# Patient Record
Sex: FEMALE | Race: WHITE | NOT HISPANIC OR LATINO | Employment: OTHER | URBAN - METROPOLITAN AREA
[De-identification: names, ages, dates, MRNs, and addresses within clinical notes are randomized per-mention and may not be internally consistent; named-entity substitution may affect disease eponyms.]

---

## 2023-03-22 LAB
ALANINE AMINOTRANSFERASE (SGPT) (U/L) IN SER/PLAS: 13 U/L (ref 7–45)
ALBUMIN (G/DL) IN SER/PLAS: 4.2 G/DL (ref 3.4–5)
ALBUMIN (MG/L) IN URINE: 7.6 MG/L
ALBUMIN/CREATININE (UG/MG) IN URINE: 8 UG/MG CRT (ref 0–30)
ALKALINE PHOSPHATASE (U/L) IN SER/PLAS: 65 U/L (ref 33–136)
ANION GAP IN SER/PLAS: 12 MMOL/L (ref 10–20)
ASPARTATE AMINOTRANSFERASE (SGOT) (U/L) IN SER/PLAS: 16 U/L (ref 9–39)
BILIRUBIN TOTAL (MG/DL) IN SER/PLAS: 0.5 MG/DL (ref 0–1.2)
CALCIUM (MG/DL) IN SER/PLAS: 9.8 MG/DL (ref 8.6–10.6)
CARBON DIOXIDE, TOTAL (MMOL/L) IN SER/PLAS: 33 MMOL/L (ref 21–32)
CHLORIDE (MMOL/L) IN SER/PLAS: 101 MMOL/L (ref 98–107)
CHOLESTEROL (MG/DL) IN SER/PLAS: 128 MG/DL (ref 0–199)
CHOLESTEROL IN HDL (MG/DL) IN SER/PLAS: 65.8 MG/DL
CHOLESTEROL/HDL RATIO: 1.9
CREATININE (MG/DL) IN SER/PLAS: 0.78 MG/DL (ref 0.5–1.05)
CREATININE (MG/DL) IN URINE: 95.5 MG/DL (ref 20–320)
ESTIMATED AVERAGE GLUCOSE FOR HBA1C: 140 MG/DL
GFR FEMALE: 78 ML/MIN/1.73M2
GLUCOSE (MG/DL) IN SER/PLAS: 159 MG/DL (ref 74–99)
HEMOGLOBIN A1C/HEMOGLOBIN TOTAL IN BLOOD: 6.5 %
LDL: 34 MG/DL (ref 0–99)
LIPASE (U/L) IN SER/PLAS: 24 U/L (ref 9–82)
POTASSIUM (MMOL/L) IN SER/PLAS: 4 MMOL/L (ref 3.5–5.3)
PROTEIN TOTAL: 6.7 G/DL (ref 6.4–8.2)
SODIUM (MMOL/L) IN SER/PLAS: 142 MMOL/L (ref 136–145)
THYROTROPIN (MIU/L) IN SER/PLAS BY DETECTION LIMIT <= 0.05 MIU/L: 1.6 MIU/L (ref 0.44–3.98)
TRIGLYCERIDE (MG/DL) IN SER/PLAS: 141 MG/DL (ref 0–149)
UREA NITROGEN (MG/DL) IN SER/PLAS: 14 MG/DL (ref 6–23)
VLDL: 28 MG/DL (ref 0–40)

## 2023-04-04 DIAGNOSIS — I10 PRIMARY HYPERTENSION: Primary | ICD-10-CM

## 2023-04-04 RX ORDER — LISINOPRIL 10 MG/1
TABLET ORAL
Qty: 90 TABLET | Refills: 2 | Status: SHIPPED | OUTPATIENT
Start: 2023-04-04 | End: 2023-12-21

## 2023-05-14 PROBLEM — I87.8 VENOUS STASIS OF BOTH LOWER EXTREMITIES: Status: ACTIVE | Noted: 2023-05-14

## 2023-05-14 PROBLEM — H90.3 SENSORINEURAL HEARING LOSS, ASYMMETRICAL: Status: ACTIVE | Noted: 2023-05-14

## 2023-05-14 PROBLEM — R13.10 DYSPHAGIA: Status: ACTIVE | Noted: 2023-05-14

## 2023-05-14 PROBLEM — N85.00 ENDOMETRIAL HYPERPLASIA: Status: ACTIVE | Noted: 2023-05-14

## 2023-05-14 PROBLEM — K21.9 GASTROESOPHAGEAL REFLUX DISEASE: Status: ACTIVE | Noted: 2023-05-14

## 2023-05-14 PROBLEM — E28.39 HYPOESTROGENISM: Status: ACTIVE | Noted: 2023-05-14

## 2023-05-14 PROBLEM — I10 BENIGN ESSENTIAL HYPERTENSION: Status: ACTIVE | Noted: 2023-05-14

## 2023-05-14 PROBLEM — I63.9 CEREBROVASCULAR ACCIDENT (MULTI): Status: ACTIVE | Noted: 2023-05-14

## 2023-05-14 PROBLEM — N39.41 URGE INCONTINENCE: Status: ACTIVE | Noted: 2023-05-14

## 2023-05-14 PROBLEM — H04.123 DRY EYES, BILATERAL: Status: ACTIVE | Noted: 2023-05-14

## 2023-05-14 PROBLEM — M75.22: Status: ACTIVE | Noted: 2023-05-14

## 2023-05-14 PROBLEM — E55.9 MILD VITAMIN D DEFICIENCY: Status: ACTIVE | Noted: 2023-05-14

## 2023-05-14 PROBLEM — N39.3 STRESS INCONTINENCE IN FEMALE: Status: ACTIVE | Noted: 2023-05-14

## 2023-05-14 PROBLEM — M54.50 LOW BACK PAIN: Status: ACTIVE | Noted: 2023-05-14

## 2023-05-14 PROBLEM — F32.A DEPRESSION: Status: ACTIVE | Noted: 2023-05-14

## 2023-05-14 PROBLEM — M85.80 OSTEOPENIA: Status: ACTIVE | Noted: 2023-05-14

## 2023-05-14 PROBLEM — R33.9 URINARY RETENTION: Status: ACTIVE | Noted: 2023-05-14

## 2023-05-14 PROBLEM — M25.519 SHOULDER PAIN: Status: ACTIVE | Noted: 2023-05-14

## 2023-05-14 PROBLEM — I83.891 VARICOSE VEINS OF RIGHT LOWER EXTREMITY WITH EDEMA: Status: ACTIVE | Noted: 2023-05-14

## 2023-05-14 PROBLEM — R32 URINARY INCONTINENCE: Status: ACTIVE | Noted: 2023-05-14

## 2023-05-14 PROBLEM — R60.0 LOCALIZED EDEMA: Status: ACTIVE | Noted: 2023-05-14

## 2023-05-14 PROBLEM — E78.5 HYPERLIPIDEMIA: Status: ACTIVE | Noted: 2023-05-14

## 2023-05-14 PROBLEM — M25.562 LEFT KNEE PAIN: Status: ACTIVE | Noted: 2023-05-14

## 2023-05-14 PROBLEM — N32.81 OVERACTIVE BLADDER: Status: ACTIVE | Noted: 2023-05-14

## 2023-05-14 PROBLEM — E11.9 DIABETES MELLITUS (MULTI): Status: ACTIVE | Noted: 2023-05-14

## 2023-05-14 PROBLEM — M75.81 ROTATOR CUFF TENDONITIS, RIGHT: Status: ACTIVE | Noted: 2023-05-14

## 2023-05-14 PROBLEM — R53.83 FATIGUE: Status: ACTIVE | Noted: 2023-05-14

## 2023-05-14 RX ORDER — ASPIRIN 81 MG/1
1 TABLET ORAL DAILY
COMMUNITY
Start: 2020-01-02

## 2023-05-14 RX ORDER — FENOFIBRATE 48 MG/1
1 TABLET, FILM COATED ORAL DAILY
COMMUNITY
Start: 2014-06-24 | End: 2023-11-30

## 2023-05-14 RX ORDER — FESOTERODINE FUMARATE 8 MG/1
1 TABLET, FILM COATED, EXTENDED RELEASE ORAL DAILY
COMMUNITY
End: 2023-10-17 | Stop reason: ALTCHOICE

## 2023-05-14 RX ORDER — METFORMIN HYDROCHLORIDE 500 MG/1
TABLET, EXTENDED RELEASE ORAL
COMMUNITY
Start: 2013-08-09 | End: 2023-05-18 | Stop reason: ALTCHOICE

## 2023-05-14 RX ORDER — ALBUTEROL SULFATE 90 UG/1
AEROSOL, METERED RESPIRATORY (INHALATION)
COMMUNITY
Start: 2017-03-04

## 2023-05-14 RX ORDER — CHOLECALCIFEROL (VITAMIN D3) 50 MCG
TABLET ORAL
COMMUNITY

## 2023-05-14 RX ORDER — REPAGLINIDE 1 MG/1
TABLET ORAL
COMMUNITY
Start: 2021-06-24 | End: 2024-02-26 | Stop reason: SDUPTHER

## 2023-05-14 RX ORDER — SIMVASTATIN 20 MG/1
1 TABLET, FILM COATED ORAL NIGHTLY
COMMUNITY
Start: 2015-11-05 | End: 2023-06-26

## 2023-05-14 RX ORDER — PAROXETINE HYDROCHLORIDE 20 MG/1
1 TABLET, FILM COATED ORAL DAILY
COMMUNITY
End: 2023-11-20 | Stop reason: WASHOUT

## 2023-05-14 RX ORDER — MIRABEGRON 50 MG/1
1 TABLET, FILM COATED, EXTENDED RELEASE ORAL DAILY
COMMUNITY
Start: 2022-11-16 | End: 2023-10-17 | Stop reason: SDUPTHER

## 2023-05-14 RX ORDER — HYDROCHLOROTHIAZIDE 12.5 MG/1
CAPSULE ORAL
COMMUNITY

## 2023-05-14 RX ORDER — OMEPRAZOLE 20 MG/1
CAPSULE, DELAYED RELEASE ORAL
COMMUNITY
End: 2024-02-09 | Stop reason: ALTCHOICE

## 2023-05-14 RX ORDER — METFORMIN HYDROCHLORIDE 500 MG/1
TABLET ORAL 2 TIMES DAILY
COMMUNITY
End: 2023-05-18 | Stop reason: ALTCHOICE

## 2023-05-14 RX ORDER — REPAGLINIDE 2 MG/1
TABLET ORAL
COMMUNITY
Start: 2022-11-21

## 2023-05-18 ENCOUNTER — OFFICE VISIT (OUTPATIENT)
Dept: PRIMARY CARE | Facility: CLINIC | Age: 78
End: 2023-05-18
Payer: MEDICARE

## 2023-05-18 VITALS
DIASTOLIC BLOOD PRESSURE: 78 MMHG | WEIGHT: 186 LBS | TEMPERATURE: 97.6 F | SYSTOLIC BLOOD PRESSURE: 125 MMHG | BODY MASS INDEX: 30.02 KG/M2

## 2023-05-18 DIAGNOSIS — E11.9 TYPE 2 DIABETES MELLITUS WITHOUT COMPLICATION, WITHOUT LONG-TERM CURRENT USE OF INSULIN (MULTI): ICD-10-CM

## 2023-05-18 DIAGNOSIS — L03.115 CELLULITIS OF RIGHT LOWER LEG: ICD-10-CM

## 2023-05-18 DIAGNOSIS — I10 BENIGN ESSENTIAL HYPERTENSION: Primary | ICD-10-CM

## 2023-05-18 DIAGNOSIS — F32.A DEPRESSION, UNSPECIFIED DEPRESSION TYPE: ICD-10-CM

## 2023-05-18 PROBLEM — E66.01 OBESITY, MORBID (MULTI): Status: ACTIVE | Noted: 2023-05-18

## 2023-05-18 PROCEDURE — 3078F DIAST BP <80 MM HG: CPT | Performed by: INTERNAL MEDICINE

## 2023-05-18 PROCEDURE — 1159F MED LIST DOCD IN RCRD: CPT | Performed by: INTERNAL MEDICINE

## 2023-05-18 PROCEDURE — 3074F SYST BP LT 130 MM HG: CPT | Performed by: INTERNAL MEDICINE

## 2023-05-18 PROCEDURE — 99213 OFFICE O/P EST LOW 20 MIN: CPT | Performed by: INTERNAL MEDICINE

## 2023-05-18 PROCEDURE — 1036F TOBACCO NON-USER: CPT | Performed by: INTERNAL MEDICINE

## 2023-05-18 RX ORDER — CEPHALEXIN 250 MG/1
250 CAPSULE ORAL 4 TIMES DAILY
Qty: 28 CAPSULE | Refills: 0 | Status: SHIPPED | OUTPATIENT
Start: 2023-05-18 | End: 2023-05-25

## 2023-05-18 RX ORDER — METFORMIN HYDROCHLORIDE 500 MG/1
500 TABLET ORAL
COMMUNITY
End: 2024-05-24 | Stop reason: SDUPTHER

## 2023-05-18 ASSESSMENT — ENCOUNTER SYMPTOMS
FEVER: 0
DEPRESSION: 0
DIZZINESS: 0
BLOOD IN STOOL: 0
HEADACHES: 0
MYALGIAS: 0
COLOR CHANGE: 1
CHILLS: 0
DIARRHEA: 0
CONSTIPATION: 0
NECK PAIN: 0
ABDOMINAL PAIN: 0
SHORTNESS OF BREATH: 0
FATIGUE: 0
OCCASIONAL FEELINGS OF UNSTEADINESS: 1
BACK PAIN: 0
PALPITATIONS: 0
COUGH: 0
ARTHRALGIAS: 0
LOSS OF SENSATION IN FEET: 0

## 2023-05-18 NOTE — PATIENT INSTRUCTIONS
Seen today for cellulitis in your right leg  Elevate your leg  Restart Keflex 4 times a day  Please go to the ER if symptoms do not improve  Follow-up in 6 months or sooner if needed

## 2023-05-18 NOTE — PROGRESS NOTES
Subjective   Patient ID: Madalyn Chavez is a 77 y.o. female.    HPI  Patient is seen today for follow-up after ER visit 2 weeks ago for cellulitis in her right leg.  She was experiencing worsening redness pain and swelling.  She was diagnosed with cellulitis and given Keflex.  She completed the medication and has noticed some improvement .  Her right leg is still swollen and appears red.  Review of Systems   Constitutional:  Negative for chills, fatigue and fever.   Respiratory:  Negative for cough and shortness of breath.    Cardiovascular:  Negative for chest pain, palpitations and leg swelling.   Gastrointestinal:  Negative for abdominal pain, blood in stool, constipation and diarrhea.   Endocrine: Negative for cold intolerance and heat intolerance.   Musculoskeletal:  Negative for arthralgias, back pain, myalgias and neck pain.   Skin:  Positive for color change.        Right leg redness+   Neurological:  Negative for dizziness and headaches.       Objective   Physical Exam  Vitals reviewed.   Constitutional:       General: She is not in acute distress.     Appearance: Normal appearance.   HENT:      Head: Normocephalic and atraumatic.      Mouth/Throat:      Mouth: Mucous membranes are moist.   Eyes:      Extraocular Movements: Extraocular movements intact.      Conjunctiva/sclera: Conjunctivae normal.      Pupils: Pupils are equal, round, and reactive to light.   Cardiovascular:      Rate and Rhythm: Normal rate and regular rhythm.      Pulses: Normal pulses.   Pulmonary:      Effort: Pulmonary effort is normal. No respiratory distress.      Breath sounds: Normal breath sounds.   Abdominal:      General: Bowel sounds are normal. There is no distension.      Tenderness: There is no abdominal tenderness.   Musculoskeletal:         General: No swelling or tenderness. Normal range of motion.      Cervical back: Normal range of motion.   Skin:     General: Skin is warm.      Comments: Right lower  leg-erythema, slight tenderness on palpation and edema   Neurological:      General: No focal deficit present.      Mental Status: She is alert.      Coordination: Coordination normal.      Gait: Gait normal.   Psychiatric:         Mood and Affect: Mood normal.         Behavior: Behavior normal.         Assessment/Plan   Diagnoses and all orders for this visit:  Benign essential hypertension  Comments:  Continue with lisinopril  Type 2 diabetes mellitus without complication, without long-term current use of insulin (CMS/[facility])  Comments:  Continue with metformin as prescribed  Depression, unspecified depression type  Comments:  Stable  Continue with current medication  Cellulitis of right lower leg  Comments:  Repeat the dose of Keflex given history of allergies to several antibiotics  Elevate leg while sitting  Take NSAIDs as needed  Orders:  -     cephalexin (Keflex) 250 mg capsule; Take 1 capsule (250 mg) by mouth 4 times a day for 7 days.

## 2023-06-17 LAB — URINE CULTURE: ABNORMAL

## 2023-06-20 ENCOUNTER — TELEPHONE (OUTPATIENT)
Dept: PRIMARY CARE | Facility: CLINIC | Age: 78
End: 2023-06-20
Payer: MEDICARE

## 2023-06-20 DIAGNOSIS — L03.115 CELLULITIS OF RIGHT LOWER LEG: Primary | ICD-10-CM

## 2023-06-26 DIAGNOSIS — E78.49 OTHER HYPERLIPIDEMIA: ICD-10-CM

## 2023-06-26 DIAGNOSIS — I10 PRIMARY HYPERTENSION: Primary | ICD-10-CM

## 2023-06-26 RX ORDER — HYDROCHLOROTHIAZIDE 12.5 MG/1
TABLET ORAL
Qty: 90 TABLET | Refills: 1 | Status: SHIPPED | OUTPATIENT
Start: 2023-06-26 | End: 2024-01-02

## 2023-06-26 RX ORDER — SIMVASTATIN 20 MG/1
TABLET, FILM COATED ORAL
Qty: 90 TABLET | Refills: 1 | Status: SHIPPED | OUTPATIENT
Start: 2023-06-26 | End: 2024-01-02

## 2023-07-28 LAB
ALANINE AMINOTRANSFERASE (SGPT) (U/L) IN SER/PLAS: 10 U/L (ref 7–45)
ALBUMIN (G/DL) IN SER/PLAS: 3.9 G/DL (ref 3.4–5)
ALBUMIN (MG/L) IN URINE: 77.7 MG/L
ALBUMIN/CREATININE (UG/MG) IN URINE: 74.7 UG/MG CRT (ref 0–30)
ALKALINE PHOSPHATASE (U/L) IN SER/PLAS: 69 U/L (ref 33–136)
ANION GAP IN SER/PLAS: 14 MMOL/L (ref 10–20)
ASPARTATE AMINOTRANSFERASE (SGOT) (U/L) IN SER/PLAS: 15 U/L (ref 9–39)
BILIRUBIN TOTAL (MG/DL) IN SER/PLAS: 0.5 MG/DL (ref 0–1.2)
CALCIUM (MG/DL) IN SER/PLAS: 9.5 MG/DL (ref 8.6–10.6)
CARBON DIOXIDE, TOTAL (MMOL/L) IN SER/PLAS: 29 MMOL/L (ref 21–32)
CHLORIDE (MMOL/L) IN SER/PLAS: 105 MMOL/L (ref 98–107)
CHOLESTEROL (MG/DL) IN SER/PLAS: 115 MG/DL (ref 0–199)
CHOLESTEROL IN HDL (MG/DL) IN SER/PLAS: 56.1 MG/DL
CHOLESTEROL/HDL RATIO: 2
CREATININE (MG/DL) IN SER/PLAS: 1.19 MG/DL (ref 0.5–1.05)
CREATININE (MG/DL) IN URINE: 104 MG/DL (ref 20–320)
ESTIMATED AVERAGE GLUCOSE FOR HBA1C: 146 MG/DL
GFR FEMALE: 47 ML/MIN/1.73M2
GLUCOSE (MG/DL) IN SER/PLAS: 119 MG/DL (ref 74–99)
HEMOGLOBIN A1C/HEMOGLOBIN TOTAL IN BLOOD: 6.7 %
LDL: 30 MG/DL (ref 0–99)
POTASSIUM (MMOL/L) IN SER/PLAS: 4.1 MMOL/L (ref 3.5–5.3)
PROTEIN TOTAL: 6.8 G/DL (ref 6.4–8.2)
SODIUM (MMOL/L) IN SER/PLAS: 144 MMOL/L (ref 136–145)
THYROTROPIN (MIU/L) IN SER/PLAS BY DETECTION LIMIT <= 0.05 MIU/L: 0.99 MIU/L (ref 0.44–3.98)
TRIGLYCERIDE (MG/DL) IN SER/PLAS: 146 MG/DL (ref 0–149)
UREA NITROGEN (MG/DL) IN SER/PLAS: 22 MG/DL (ref 6–23)
VLDL: 29 MG/DL (ref 0–40)

## 2023-10-04 ENCOUNTER — LAB (OUTPATIENT)
Dept: LAB | Facility: LAB | Age: 78
End: 2023-10-04
Payer: MEDICARE

## 2023-10-04 DIAGNOSIS — E11.9 TYPE 2 DIABETES MELLITUS WITHOUT COMPLICATIONS (MULTI): Primary | ICD-10-CM

## 2023-10-04 LAB
ANION GAP SERPL CALC-SCNC: 14 MMOL/L (ref 10–20)
BUN SERPL-MCNC: 18 MG/DL (ref 6–23)
CALCIUM SERPL-MCNC: 9.5 MG/DL (ref 8.6–10.6)
CHLORIDE SERPL-SCNC: 104 MMOL/L (ref 98–107)
CO2 SERPL-SCNC: 28 MMOL/L (ref 21–32)
CREAT SERPL-MCNC: 0.82 MG/DL (ref 0.5–1.05)
CREAT UR-MCNC: 85.9 MG/DL (ref 20–320)
GFR SERPL CREATININE-BSD FRML MDRD: 74 ML/MIN/1.73M*2
GLUCOSE SERPL-MCNC: 181 MG/DL (ref 74–99)
MICROALBUMIN UR-MCNC: <7 MG/L
MICROALBUMIN/CREAT UR: NORMAL MG/G{CREAT}
POTASSIUM SERPL-SCNC: 3.7 MMOL/L (ref 3.5–5.3)
SODIUM SERPL-SCNC: 142 MMOL/L (ref 136–145)

## 2023-10-04 PROCEDURE — 36415 COLL VENOUS BLD VENIPUNCTURE: CPT

## 2023-10-04 PROCEDURE — 82570 ASSAY OF URINE CREATININE: CPT

## 2023-10-04 PROCEDURE — 80048 BASIC METABOLIC PNL TOTAL CA: CPT

## 2023-10-04 PROCEDURE — 82043 UR ALBUMIN QUANTITATIVE: CPT

## 2023-10-14 DIAGNOSIS — F32.A DEPRESSION, UNSPECIFIED DEPRESSION TYPE: Primary | ICD-10-CM

## 2023-10-15 PROBLEM — L57.0 ACTINIC KERATOSIS: Status: ACTIVE | Noted: 2022-05-31

## 2023-10-15 PROBLEM — H52.4 MYOPIA WITH PRESBYOPIA: Status: ACTIVE | Noted: 2023-10-15

## 2023-10-15 PROBLEM — L21.9 SEBORRHEIC DERMATITIS, UNSPECIFIED: Status: ACTIVE | Noted: 2022-05-31

## 2023-10-15 PROBLEM — Z46.89 PESSARY MAINTENANCE: Status: ACTIVE | Noted: 2023-10-15

## 2023-10-15 PROBLEM — H52.00 HYPEROPIA: Status: ACTIVE | Noted: 2023-10-15

## 2023-10-15 PROBLEM — H93.19 TINNITUS: Status: ACTIVE | Noted: 2023-10-15

## 2023-10-15 PROBLEM — L81.4 OTHER MELANIN HYPERPIGMENTATION: Status: ACTIVE | Noted: 2022-05-31

## 2023-10-15 PROBLEM — F33.9 DEPRESSION, MAJOR, RECURRENT (CMS-HCC): Status: ACTIVE | Noted: 2023-10-15

## 2023-10-15 PROBLEM — E83.110: Status: ACTIVE | Noted: 2023-10-15

## 2023-10-15 PROBLEM — N95.1 MENOPAUSE SYNDROME: Status: ACTIVE | Noted: 2023-10-15

## 2023-10-15 PROBLEM — D18.01 HEMANGIOMA OF SKIN AND SUBCUTANEOUS TISSUE: Status: ACTIVE | Noted: 2022-05-31

## 2023-10-15 PROBLEM — H26.492 PCO (POSTERIOR CAPSULAR OPACIFICATION), LEFT: Status: ACTIVE | Noted: 2023-10-15

## 2023-10-15 PROBLEM — E66.3 OVERWEIGHT WITH BODY MASS INDEX (BMI) OF 28 TO 28.9 IN ADULT: Status: ACTIVE | Noted: 2023-10-15

## 2023-10-15 PROBLEM — L90.5 SCAR CONDITION AND FIBROSIS OF SKIN: Status: ACTIVE | Noted: 2022-05-31

## 2023-10-15 PROBLEM — T83.89XA VAGINAL EROSION SECONDARY TO PESSARY USE (CMS-HCC): Status: ACTIVE | Noted: 2023-10-15

## 2023-10-15 PROBLEM — D23.5 DERMATOFIBROMA OF TRUNK: Status: ACTIVE | Noted: 2022-05-31

## 2023-10-15 PROBLEM — L82.0 INFLAMED SEBORRHEIC KERATOSIS: Status: ACTIVE | Noted: 2022-05-31

## 2023-10-15 PROBLEM — N89.8 VAGINAL EROSION SECONDARY TO PESSARY USE (CMS-HCC): Status: ACTIVE | Noted: 2023-10-15

## 2023-10-15 PROBLEM — E55.9 VITAMIN D DEFICIENCY: Status: ACTIVE | Noted: 2023-10-15

## 2023-10-15 PROBLEM — D49.9 NEOPLASM: Status: ACTIVE | Noted: 2022-05-31

## 2023-10-15 PROBLEM — Z85.828 PERSONAL HISTORY OF OTHER MALIGNANT NEOPLASM OF SKIN: Status: ACTIVE | Noted: 2022-05-31

## 2023-10-15 PROBLEM — D23.9 OTHER BENIGN NEOPLASM OF SKIN, UNSPECIFIED: Status: ACTIVE | Noted: 2022-05-31

## 2023-10-15 PROBLEM — Z96.1 PSEUDOPHAKIA OF BOTH EYES: Status: ACTIVE | Noted: 2023-10-15

## 2023-10-15 PROBLEM — N81.3 COMPLETE UTEROVAGINAL PROLAPSE: Status: ACTIVE | Noted: 2023-10-15

## 2023-10-15 PROBLEM — E66.811 CLASS 1 OBESITY DUE TO EXCESS CALORIES WITH BODY MASS INDEX (BMI) OF 30.0 TO 30.9 IN ADULT: Status: ACTIVE | Noted: 2023-10-15

## 2023-10-15 PROBLEM — H52.10 MYOPIA WITH PRESBYOPIA: Status: ACTIVE | Noted: 2023-10-15

## 2023-10-15 PROBLEM — D48.5 NEOPLASM OF UNCERTAIN BEHAVIOR OF SKIN: Status: ACTIVE | Noted: 2022-05-31

## 2023-10-15 PROBLEM — L57.9 SKIN CHANGES DUE TO CHRONIC EXPOSURE TO NONIONIZING RADIATION, UNSPECIFIED: Status: ACTIVE | Noted: 2022-05-31

## 2023-10-15 PROBLEM — E66.09 CLASS 1 OBESITY DUE TO EXCESS CALORIES WITH BODY MASS INDEX (BMI) OF 30.0 TO 30.9 IN ADULT: Status: ACTIVE | Noted: 2023-10-15

## 2023-10-15 RX ORDER — LANCETS
EACH MISCELLANEOUS
COMMUNITY

## 2023-10-15 RX ORDER — PAROXETINE HYDROCHLORIDE 40 MG/1
40 TABLET, FILM COATED ORAL DAILY
COMMUNITY
End: 2024-05-31 | Stop reason: SDUPTHER

## 2023-10-15 RX ORDER — DICLOFENAC SODIUM 10 MG/G
4 GEL TOPICAL 2 TIMES DAILY PRN
COMMUNITY

## 2023-10-15 RX ORDER — INSULIN PUMP SYRINGE, 3 ML
1 EACH MISCELLANEOUS AS NEEDED
COMMUNITY

## 2023-10-15 RX ORDER — OMEPRAZOLE 40 MG/1
40 CAPSULE, DELAYED RELEASE ORAL DAILY
COMMUNITY
Start: 2023-08-12 | End: 2024-02-09 | Stop reason: ALTCHOICE

## 2023-10-15 RX ORDER — ORAL SEMAGLUTIDE 3 MG/1
3 TABLET ORAL DAILY
COMMUNITY
Start: 2023-06-20

## 2023-10-15 RX ORDER — BLOOD-GLUCOSE METER
EACH MISCELLANEOUS
COMMUNITY

## 2023-10-15 RX ORDER — BLOOD SUGAR DIAGNOSTIC
STRIP MISCELLANEOUS
COMMUNITY
End: 2024-06-07 | Stop reason: SDUPTHER

## 2023-10-15 RX ORDER — GRANULES FOR ORAL 3 G/1
POWDER ORAL
COMMUNITY
Start: 2023-06-15 | End: 2023-12-22 | Stop reason: SDUPTHER

## 2023-10-16 RX ORDER — PAROXETINE HYDROCHLORIDE 40 MG/1
40 TABLET, FILM COATED ORAL DAILY
Qty: 90 TABLET | Refills: 0 | Status: SHIPPED | OUTPATIENT
Start: 2023-10-16 | End: 2023-11-20 | Stop reason: WASHOUT

## 2023-10-17 ENCOUNTER — OFFICE VISIT (OUTPATIENT)
Dept: OBSTETRICS AND GYNECOLOGY | Facility: CLINIC | Age: 78
End: 2023-10-17
Payer: MEDICARE

## 2023-10-17 VITALS
SYSTOLIC BLOOD PRESSURE: 118 MMHG | DIASTOLIC BLOOD PRESSURE: 70 MMHG | WEIGHT: 186 LBS | BODY MASS INDEX: 29.19 KG/M2 | HEIGHT: 67 IN

## 2023-10-17 DIAGNOSIS — N39.41 URGE URINARY INCONTINENCE: Primary | ICD-10-CM

## 2023-10-17 PROCEDURE — 1159F MED LIST DOCD IN RCRD: CPT | Performed by: NURSE PRACTITIONER

## 2023-10-17 PROCEDURE — 99213 OFFICE O/P EST LOW 20 MIN: CPT | Performed by: NURSE PRACTITIONER

## 2023-10-17 PROCEDURE — 99213 OFFICE O/P EST LOW 20 MIN: CPT | Mod: PO | Performed by: NURSE PRACTITIONER

## 2023-10-17 PROCEDURE — 1126F AMNT PAIN NOTED NONE PRSNT: CPT | Performed by: NURSE PRACTITIONER

## 2023-10-17 PROCEDURE — 3074F SYST BP LT 130 MM HG: CPT | Performed by: NURSE PRACTITIONER

## 2023-10-17 PROCEDURE — 3078F DIAST BP <80 MM HG: CPT | Performed by: NURSE PRACTITIONER

## 2023-10-17 PROCEDURE — 51798 US URINE CAPACITY MEASURE: CPT | Performed by: NURSE PRACTITIONER

## 2023-10-17 PROCEDURE — 1036F TOBACCO NON-USER: CPT | Performed by: NURSE PRACTITIONER

## 2023-10-17 PROCEDURE — 51798 US URINE CAPACITY MEASURE: CPT | Mod: PO | Performed by: NURSE PRACTITIONER

## 2023-10-17 RX ORDER — MIRABEGRON 50 MG/1
50 TABLET, FILM COATED, EXTENDED RELEASE ORAL DAILY
Qty: 90 TABLET | Refills: 3 | Status: SHIPPED | OUTPATIENT
Start: 2023-10-17 | End: 2024-10-16

## 2023-10-17 ASSESSMENT — PAIN SCALES - GENERAL: PAINLEVEL: 0-NO PAIN

## 2023-10-17 NOTE — PROGRESS NOTES
Madalyn Chavez is a 78 y.o. female who received 180 units  1 months ago. Patient denies worsening symptoms.  Still taking Myrbetriq 50 mg     Testing results:  PVR results are available and reviewed, 123 ml   UA results not available, unable to void     Color, Urine   Date Value Ref Range Status   01/24/2020 YELLOW STRAW,YELLOW Final     Appearance, Urine   Date Value Ref Range Status   01/24/2020 HAZY CLEAR Final     pH, Urine   Date Value Ref Range Status   01/24/2020 9.0 (H) 5.0 - 8.0 Final     Protein, Urine   Date Value Ref Range Status   01/24/2020 30(1+) (A) NEGATIVE mg/dL Final     Glucose, Urine   Date Value Ref Range Status   01/24/2020 NEGATIVE NEGATIVE mg/dL Final     Blood, Urine   Date Value Ref Range Status   01/24/2020 NEGATIVE NEGATIVE Final     Ketones, Urine   Date Value Ref Range Status   01/24/2020 NEGATIVE NEGATIVE mg/dL Final     Bilirubin, Urine   Date Value Ref Range Status   01/24/2020 NEGATIVE NEGATIVE Final     Urobilinogen, Urine   Date Value Ref Range Status   01/24/2020 <2.0 0.0 - 1.9 mg/dL Final     Nitrite, Urine   Date Value Ref Range Status   01/24/2020 NEGATIVE NEGATIVE Final     Urine Culture   Date Value Ref Range Status   06/13/2023 Pseudomonas aeruginosa (A)  Final     Comment:     >100,000 CFU/ML       History:  Urge Symptoms:   Some women receive very little warning and suddenly find that they are losing, or about to lose, urine beyond their control. How often does this happen to you? 2 - Sometimes  If you can't find a toilet or find that the toilet is occupied when you have an urge to urinate, how often do you end up lossing urine or wetting yourself? 2 - Sometimes  How often do you urinate at night? 1 - Rarely    Assessment and Plan  78 y.o. female assessed in clinic today for Botox fuv for OAB    OAB    Plan  OAB  Begin setting alarm to urinate to avoid wetting herself   Jennifer was removed from med list  50 MG Myrbetriq 90-day was renewed     Follow-up in 5 months  with Dr. Borja for Botox     REAGAN Voss Megan Terrell, am scribing for virtually, and in the presence of REAGAN Muse on 10/17/2023 at 5:36 PM.

## 2023-11-20 ENCOUNTER — OFFICE VISIT (OUTPATIENT)
Dept: PRIMARY CARE | Facility: CLINIC | Age: 78
End: 2023-11-20
Payer: MEDICARE

## 2023-11-20 VITALS
HEIGHT: 67 IN | BODY MASS INDEX: 29.03 KG/M2 | DIASTOLIC BLOOD PRESSURE: 76 MMHG | WEIGHT: 185 LBS | SYSTOLIC BLOOD PRESSURE: 130 MMHG | TEMPERATURE: 96.8 F

## 2023-11-20 DIAGNOSIS — Z00.00 MEDICARE ANNUAL WELLNESS VISIT, SUBSEQUENT: ICD-10-CM

## 2023-11-20 DIAGNOSIS — I10 BENIGN ESSENTIAL HYPERTENSION: Primary | ICD-10-CM

## 2023-11-20 DIAGNOSIS — Z23 INFLUENZA VACCINATION ADMINISTERED AT CURRENT VISIT: ICD-10-CM

## 2023-11-20 DIAGNOSIS — F33.41 RECURRENT MAJOR DEPRESSIVE DISORDER, IN PARTIAL REMISSION (CMS-HCC): ICD-10-CM

## 2023-11-20 DIAGNOSIS — E83.110: ICD-10-CM

## 2023-11-20 DIAGNOSIS — E11.9 TYPE 2 DIABETES MELLITUS WITHOUT COMPLICATION, WITHOUT LONG-TERM CURRENT USE OF INSULIN (MULTI): ICD-10-CM

## 2023-11-20 DIAGNOSIS — F32.A DEPRESSION, UNSPECIFIED DEPRESSION TYPE: ICD-10-CM

## 2023-11-20 DIAGNOSIS — E66.01 OBESITY, MORBID (MULTI): ICD-10-CM

## 2023-11-20 DIAGNOSIS — K21.9 GASTROESOPHAGEAL REFLUX DISEASE WITHOUT ESOPHAGITIS: ICD-10-CM

## 2023-11-20 LAB — POC HEMOGLOBIN A1C: 6.4 % (ref 4.2–6.5)

## 2023-11-20 PROCEDURE — 1160F RVW MEDS BY RX/DR IN RCRD: CPT | Performed by: INTERNAL MEDICINE

## 2023-11-20 PROCEDURE — 83036 HEMOGLOBIN GLYCOSYLATED A1C: CPT | Performed by: INTERNAL MEDICINE

## 2023-11-20 PROCEDURE — 1036F TOBACCO NON-USER: CPT | Performed by: INTERNAL MEDICINE

## 2023-11-20 PROCEDURE — 1159F MED LIST DOCD IN RCRD: CPT | Performed by: INTERNAL MEDICINE

## 2023-11-20 PROCEDURE — 99214 OFFICE O/P EST MOD 30 MIN: CPT | Performed by: INTERNAL MEDICINE

## 2023-11-20 PROCEDURE — 3078F DIAST BP <80 MM HG: CPT | Performed by: INTERNAL MEDICINE

## 2023-11-20 PROCEDURE — 1126F AMNT PAIN NOTED NONE PRSNT: CPT | Performed by: INTERNAL MEDICINE

## 2023-11-20 PROCEDURE — 90662 IIV NO PRSV INCREASED AG IM: CPT | Performed by: INTERNAL MEDICINE

## 2023-11-20 PROCEDURE — G0008 ADMIN INFLUENZA VIRUS VAC: HCPCS | Performed by: INTERNAL MEDICINE

## 2023-11-20 PROCEDURE — 1170F FXNL STATUS ASSESSED: CPT | Performed by: INTERNAL MEDICINE

## 2023-11-20 PROCEDURE — G0439 PPPS, SUBSEQ VISIT: HCPCS | Performed by: INTERNAL MEDICINE

## 2023-11-20 PROCEDURE — 3075F SYST BP GE 130 - 139MM HG: CPT | Performed by: INTERNAL MEDICINE

## 2023-11-20 ASSESSMENT — ENCOUNTER SYMPTOMS
ABDOMINAL PAIN: 0
CONSTIPATION: 0
COUGH: 0
SLEEP DISTURBANCE: 0
OCCASIONAL FEELINGS OF UNSTEADINESS: 0
LOSS OF SENSATION IN FEET: 0
PALPITATIONS: 0
CHILLS: 0
BACK PAIN: 0
HEADACHES: 0
BLOOD IN STOOL: 0
FREQUENCY: 0
MYALGIAS: 0
NECK PAIN: 0
DIZZINESS: 0
ARTHRALGIAS: 0
SHORTNESS OF BREATH: 0
FEVER: 0
SINUS PRESSURE: 0
FATIGUE: 1
DEPRESSION: 0
DIARRHEA: 0
WEAKNESS: 0

## 2023-11-20 ASSESSMENT — ACTIVITIES OF DAILY LIVING (ADL)
MANAGING_FINANCES: INDEPENDENT
DRESSING: INDEPENDENT
BATHING: INDEPENDENT
BATHING: INDEPENDENT
TAKING_MEDICATION: INDEPENDENT
DRESSING: INDEPENDENT
DOING_HOUSEWORK: INDEPENDENT
GROCERY_SHOPPING: INDEPENDENT

## 2023-11-20 ASSESSMENT — PATIENT HEALTH QUESTIONNAIRE - PHQ9
SUM OF ALL RESPONSES TO PHQ9 QUESTIONS 1 AND 2: 0
2. FEELING DOWN, DEPRESSED OR HOPELESS: NOT AT ALL
1. LITTLE INTEREST OR PLEASURE IN DOING THINGS: NOT AT ALL

## 2023-11-20 NOTE — PROGRESS NOTES
Subjective   Patient ID: Madalyn Chavez is a 78 y.o. female.    HPI patient seen for Medicare wellness visit.  Her medical history is significant for hypertension, hyperlipidemia and GERD.  She complains of chronic pain in her right leg with slight erythema.  She wears compression stockings during the day.  Her swelling has improved since her last visit.  She was sent to wound clinic but was discharged as she did not have any significant wound.  She feels well today.  Denies any shortness of breath or chest pain.  She was seeing endocrinologist for her diabetes who left her practice recently.  She is currently on metformin and repaglinide.    Review of Systems   Constitutional:  Positive for fatigue. Negative for chills and fever.   HENT:  Negative for congestion, sinus pressure and sneezing.    Respiratory:  Negative for cough and shortness of breath.    Cardiovascular:  Negative for chest pain, palpitations and leg swelling.   Gastrointestinal:  Negative for abdominal pain, blood in stool, constipation and diarrhea.   Endocrine: Negative for cold intolerance and heat intolerance.   Genitourinary:  Negative for frequency and urgency.   Musculoskeletal:  Negative for arthralgias, back pain, myalgias and neck pain.        Right leg PAIN   Neurological:  Negative for dizziness, weakness and headaches.   Psychiatric/Behavioral:  Negative for sleep disturbance.        Objective   Physical Exam  Vitals reviewed.   Constitutional:       General: She is not in acute distress.     Appearance: Normal appearance.   HENT:      Head: Normocephalic and atraumatic.      Mouth/Throat:      Mouth: Mucous membranes are moist.   Eyes:      Extraocular Movements: Extraocular movements intact.      Conjunctiva/sclera: Conjunctivae normal.      Pupils: Pupils are equal, round, and reactive to light.   Cardiovascular:      Rate and Rhythm: Normal rate and regular rhythm.      Pulses: Normal pulses.   Pulmonary:      Effort:  Pulmonary effort is normal. No respiratory distress.      Breath sounds: Normal breath sounds.   Abdominal:      General: Bowel sounds are normal. There is no distension.      Tenderness: There is no abdominal tenderness.   Musculoskeletal:         General: No swelling or tenderness. Normal range of motion.      Cervical back: Normal range of motion.   Skin:     General: Skin is warm.   Neurological:      General: No focal deficit present.      Mental Status: She is alert.      Coordination: Coordination normal.      Gait: Gait normal.   Psychiatric:         Mood and Affect: Mood normal.         Behavior: Behavior normal.         Assessment/Plan   Diagnoses and all orders for this visit:  Benign essential hypertension  Comments:  Stable  Continue with hydrochlorothiazide  Type 2 diabetes mellitus without complication, without long-term current use of insulin (CMS/[facility])  Comments:  Currently on metformin and Rybelsus  Checked hemoglobin A1c today 6.4-well-controlled  Orders:  -     POCT glycosylated hemoglobin (Hb A1C) manually resulted  Depression, unspecified depression type  Medicare annual wellness visit, subsequent  Recurrent major depressive disorder, in partial remission (CMS/[facility])  Comments:  Stable  Continue with paroxetine  Obesity, morbid (CMS/[facility])  Comments:  BMI is improving  Diabetes, bronzed (CMS/[facility])  Influenza vaccination administered at current visit  Comments:  High-dose influenza vaccine administered  Orders:  -     Flu vaccine, quadrivalent, high-dose, preservative free, age 65y+ (FLUZONE)  Gastroesophageal reflux disease without esophagitis  Comments:  Continue with omeprazole 40 mg daily

## 2023-11-29 DIAGNOSIS — E78.49 OTHER HYPERLIPIDEMIA: Primary | ICD-10-CM

## 2023-11-30 RX ORDER — FENOFIBRATE 48 MG/1
48 TABLET, FILM COATED ORAL DAILY
Qty: 90 TABLET | Refills: 3 | Status: SHIPPED | OUTPATIENT
Start: 2023-11-30

## 2023-12-14 ENCOUNTER — TELEPHONE (OUTPATIENT)
Dept: OBSTETRICS AND GYNECOLOGY | Facility: CLINIC | Age: 78
End: 2023-12-14
Payer: MEDICARE

## 2023-12-14 DIAGNOSIS — R39.9 UTI SYMPTOMS: Primary | ICD-10-CM

## 2023-12-18 ENCOUNTER — LAB (OUTPATIENT)
Dept: LAB | Facility: LAB | Age: 78
End: 2023-12-18
Payer: MEDICARE

## 2023-12-18 DIAGNOSIS — R39.9 UTI SYMPTOMS: ICD-10-CM

## 2023-12-18 PROCEDURE — 87086 URINE CULTURE/COLONY COUNT: CPT

## 2023-12-20 DIAGNOSIS — I10 PRIMARY HYPERTENSION: ICD-10-CM

## 2023-12-20 LAB — BACTERIA UR CULT: ABNORMAL

## 2023-12-21 RX ORDER — LISINOPRIL 10 MG/1
TABLET ORAL
Qty: 90 TABLET | Refills: 2 | Status: SHIPPED | OUTPATIENT
Start: 2023-12-21

## 2023-12-22 ENCOUNTER — LAB (OUTPATIENT)
Dept: LAB | Facility: LAB | Age: 78
End: 2023-12-22
Payer: MEDICARE

## 2023-12-22 ENCOUNTER — TELEPHONE (OUTPATIENT)
Dept: OBSTETRICS AND GYNECOLOGY | Facility: CLINIC | Age: 78
End: 2023-12-22
Payer: MEDICARE

## 2023-12-22 DIAGNOSIS — R39.9 UTI SYMPTOMS: ICD-10-CM

## 2023-12-22 DIAGNOSIS — R39.9 UTI SYMPTOMS: Primary | ICD-10-CM

## 2023-12-22 PROCEDURE — 87181 SC STD AGAR DILUTION PER AGT: CPT

## 2023-12-22 PROCEDURE — 87086 URINE CULTURE/COLONY COUNT: CPT

## 2023-12-22 RX ORDER — GRANULES FOR ORAL 3 G/1
3 POWDER ORAL ONCE
Qty: 3 G | Refills: 0 | Status: SHIPPED | OUTPATIENT
Start: 2023-12-22 | End: 2023-12-22

## 2023-12-22 NOTE — TELEPHONE ENCOUNTER
Madalyn Chavez reported s/sx UTI; order for urine culture & Antibiotic: Fosfomycin routed to Dr. Borja per pt request d/t pain or going into weekend. Madalyn Chavez understands ATB may need to be changed once final culture results are available.

## 2023-12-25 LAB — BACTERIA UR CULT: ABNORMAL

## 2024-01-01 DIAGNOSIS — I10 PRIMARY HYPERTENSION: ICD-10-CM

## 2024-01-01 DIAGNOSIS — E78.49 OTHER HYPERLIPIDEMIA: ICD-10-CM

## 2024-01-02 DIAGNOSIS — N39.0 ACUTE LOWER UTI: Primary | ICD-10-CM

## 2024-01-02 RX ORDER — HYDROCHLOROTHIAZIDE 12.5 MG/1
TABLET ORAL
Qty: 90 TABLET | Refills: 1 | Status: SHIPPED | OUTPATIENT
Start: 2024-01-02

## 2024-01-02 RX ORDER — SIMVASTATIN 20 MG/1
TABLET, FILM COATED ORAL
Qty: 90 TABLET | Refills: 1 | Status: SHIPPED | OUTPATIENT
Start: 2024-01-02

## 2024-02-09 DIAGNOSIS — K21.00 GASTROESOPHAGEAL REFLUX DISEASE WITH ESOPHAGITIS WITHOUT HEMORRHAGE: Primary | ICD-10-CM

## 2024-02-09 RX ORDER — OMEPRAZOLE 40 MG/1
CAPSULE, DELAYED RELEASE ORAL
Qty: 90 CAPSULE | Refills: 0 | Status: SHIPPED | OUTPATIENT
Start: 2024-02-09 | End: 2024-05-09

## 2024-02-26 DIAGNOSIS — E11.9 TYPE 2 DIABETES MELLITUS WITHOUT COMPLICATION, WITHOUT LONG-TERM CURRENT USE OF INSULIN (MULTI): Primary | ICD-10-CM

## 2024-02-27 RX ORDER — REPAGLINIDE 1 MG/1
1 TABLET ORAL
Qty: 90 TABLET | Refills: 3 | Status: SHIPPED | OUTPATIENT
Start: 2024-02-27

## 2024-03-01 RX ORDER — NITROFURANTOIN 25; 75 MG/1; MG/1
100 CAPSULE ORAL ONCE
Status: CANCELLED | OUTPATIENT
Start: 2024-03-18

## 2024-03-01 RX ORDER — PHENAZOPYRIDINE HYDROCHLORIDE 200 MG/1
200 TABLET, FILM COATED ORAL ONCE
Status: CANCELLED | OUTPATIENT
Start: 2024-03-18

## 2024-03-17 RX ORDER — LIDOCAINE HYDROCHLORIDE 10 MG/ML
5 INJECTION INFILTRATION; PERINEURAL ONCE
Status: COMPLETED | OUTPATIENT
Start: 2024-03-18 | End: 2024-03-18

## 2024-03-17 RX ORDER — SODIUM CHLORIDE 9 MG/ML
10 INJECTION, SOLUTION INTRAMUSCULAR; INTRAVENOUS; SUBCUTANEOUS ONCE
Status: COMPLETED | OUTPATIENT
Start: 2024-03-18 | End: 2024-03-18

## 2024-03-17 RX ORDER — LIDOCAINE HYDROCHLORIDE 20 MG/ML
1 JELLY TOPICAL ONCE
Status: COMPLETED | OUTPATIENT
Start: 2024-03-18 | End: 2024-03-18

## 2024-03-17 NOTE — PROGRESS NOTES
HPI:  Pt here for intradetrusor Botox injection    Last injection 180 U 9/2023    The patient gave a urine sample which was checked for infection and found to be negative.  Pt was numbed for 20 min with lidojet inserted in to the bladder and given 100 mg po pyridium.    The patient was consented for cytoscopic intradetrusor Botox injection.     100   Units was reconstituted in  10   mL of NS  Using a flexible scope injection was performed at 4 sites using the Laborie needle at a 3 mm depth starting in the midline just above the intraureteric ridge and then to the left and right of this site and then just above until the entire drug volume was injected followed by 1 mL NS flush.    No bleeding was noted    The procedure was completed, the patient allowed to empty her bladder and get dressed.    Post-procedure precautions were given to the patient and macrobid 100 mg po BID x 3 days script sent.    We discussed that we will proceed with lower dose of 100 Units but more frequently and she is fine with this.    STM

## 2024-03-18 ENCOUNTER — PROCEDURE VISIT (OUTPATIENT)
Dept: OBSTETRICS AND GYNECOLOGY | Facility: CLINIC | Age: 79
End: 2024-03-18
Payer: MEDICARE

## 2024-03-18 VITALS — BODY MASS INDEX: 29.6 KG/M2 | WEIGHT: 189 LBS | SYSTOLIC BLOOD PRESSURE: 122 MMHG | DIASTOLIC BLOOD PRESSURE: 78 MMHG

## 2024-03-18 DIAGNOSIS — N32.81 OVERACTIVE BLADDER: ICD-10-CM

## 2024-03-18 DIAGNOSIS — N39.41 URGE INCONTINENCE: ICD-10-CM

## 2024-03-18 DIAGNOSIS — N39.0 ACUTE LOWER UTI: Primary | ICD-10-CM

## 2024-03-18 LAB
POC APPEARANCE, URINE: CLEAR
POC BILIRUBIN, URINE: NEGATIVE
POC BLOOD, URINE: NEGATIVE
POC COLOR, URINE: YELLOW
POC GLUCOSE, URINE: NEGATIVE MG/DL
POC KETONES, URINE: NEGATIVE MG/DL
POC LEUKOCYTES, URINE: ABNORMAL
POC NITRITE,URINE: NEGATIVE
POC PH, URINE: 7 PH
POC PROTEIN, URINE: NEGATIVE MG/DL
POC SPECIFIC GRAVITY, URINE: 1.02
POC UROBILINOGEN, URINE: 0.2 EU/DL

## 2024-03-18 PROCEDURE — 52287 CYSTOSCOPY CHEMODENERVATION: CPT | Performed by: OBSTETRICS & GYNECOLOGY

## 2024-03-18 PROCEDURE — 96372 THER/PROPH/DIAG INJ SC/IM: CPT | Mod: 59 | Performed by: OBSTETRICS & GYNECOLOGY

## 2024-03-18 PROCEDURE — 2500000005 HC RX 250 GENERAL PHARMACY W/O HCPCS: Performed by: OBSTETRICS & GYNECOLOGY

## 2024-03-18 PROCEDURE — 99213 OFFICE O/P EST LOW 20 MIN: CPT | Performed by: OBSTETRICS & GYNECOLOGY

## 2024-03-18 PROCEDURE — 81003 URINALYSIS AUTO W/O SCOPE: CPT | Performed by: OBSTETRICS & GYNECOLOGY

## 2024-03-18 PROCEDURE — 2500000004 HC RX 250 GENERAL PHARMACY W/ HCPCS (ALT 636 FOR OP/ED): Performed by: OBSTETRICS & GYNECOLOGY

## 2024-03-18 PROCEDURE — A4216 STERILE WATER/SALINE, 10 ML: HCPCS | Performed by: OBSTETRICS & GYNECOLOGY

## 2024-03-18 RX ORDER — GRANULES FOR ORAL 3 G/1
3 POWDER ORAL ONCE
Qty: 3 G | Refills: 0 | Status: SHIPPED | OUTPATIENT
Start: 2024-03-18 | End: 2024-03-18

## 2024-03-18 RX ADMIN — SODIUM CHLORIDE 10 ML: 9 INJECTION INTRAMUSCULAR; INTRAVENOUS; SUBCUTANEOUS at 14:05

## 2024-03-18 RX ADMIN — LIDOCAINE HYDROCHLORIDE 1 APPLICATION: 20 JELLY TOPICAL at 14:05

## 2024-03-18 RX ADMIN — ONABOTULINUMTOXINA 100 UNITS: 100 INJECTION, POWDER, LYOPHILIZED, FOR SOLUTION INTRADERMAL; INTRAMUSCULAR at 16:58

## 2024-03-18 RX ADMIN — LIDOCAINE HYDROCHLORIDE 50 MG: 10 INJECTION, SOLUTION INFILTRATION; PERINEURAL at 14:05

## 2024-03-18 ASSESSMENT — PAIN SCALES - GENERAL: PAINLEVEL: 0-NO PAIN

## 2024-04-08 ENCOUNTER — LAB (OUTPATIENT)
Dept: LAB | Facility: LAB | Age: 79
End: 2024-04-08
Payer: MEDICARE

## 2024-04-08 ENCOUNTER — OFFICE VISIT (OUTPATIENT)
Dept: PRIMARY CARE | Facility: CLINIC | Age: 79
End: 2024-04-08
Payer: MEDICARE

## 2024-04-08 VITALS
SYSTOLIC BLOOD PRESSURE: 126 MMHG | BODY MASS INDEX: 29.29 KG/M2 | WEIGHT: 187 LBS | DIASTOLIC BLOOD PRESSURE: 83 MMHG | TEMPERATURE: 98.6 F

## 2024-04-08 DIAGNOSIS — E11.9 TYPE 2 DIABETES MELLITUS WITHOUT COMPLICATION, WITHOUT LONG-TERM CURRENT USE OF INSULIN (MULTI): ICD-10-CM

## 2024-04-08 DIAGNOSIS — E78.49 OTHER HYPERLIPIDEMIA: ICD-10-CM

## 2024-04-08 DIAGNOSIS — I10 BENIGN ESSENTIAL HYPERTENSION: ICD-10-CM

## 2024-04-08 DIAGNOSIS — F33.41 RECURRENT MAJOR DEPRESSIVE DISORDER, IN PARTIAL REMISSION (CMS-HCC): ICD-10-CM

## 2024-04-08 DIAGNOSIS — I10 BENIGN ESSENTIAL HYPERTENSION: Primary | ICD-10-CM

## 2024-04-08 PROBLEM — E66.01 OBESITY, MORBID (MULTI): Status: RESOLVED | Noted: 2023-05-18 | Resolved: 2024-04-08

## 2024-04-08 PROBLEM — E83.110: Status: RESOLVED | Noted: 2023-10-15 | Resolved: 2024-04-08

## 2024-04-08 LAB
ALBUMIN SERPL BCP-MCNC: 4 G/DL (ref 3.4–5)
ALP SERPL-CCNC: 65 U/L (ref 33–136)
ALT SERPL W P-5'-P-CCNC: 11 U/L (ref 7–45)
ANION GAP SERPL CALC-SCNC: 14 MMOL/L (ref 10–20)
AST SERPL W P-5'-P-CCNC: 13 U/L (ref 9–39)
BILIRUB SERPL-MCNC: 0.3 MG/DL (ref 0–1.2)
BUN SERPL-MCNC: 21 MG/DL (ref 6–23)
CALCIUM SERPL-MCNC: 9.5 MG/DL (ref 8.6–10.6)
CHLORIDE SERPL-SCNC: 102 MMOL/L (ref 98–107)
CO2 SERPL-SCNC: 27 MMOL/L (ref 21–32)
CREAT SERPL-MCNC: 0.84 MG/DL (ref 0.5–1.05)
EGFRCR SERPLBLD CKD-EPI 2021: 72 ML/MIN/1.73M*2
ERYTHROCYTE [DISTWIDTH] IN BLOOD BY AUTOMATED COUNT: 13.2 % (ref 11.5–14.5)
GLUCOSE SERPL-MCNC: 195 MG/DL (ref 74–99)
HCT VFR BLD AUTO: 35 % (ref 36–46)
HGB BLD-MCNC: 10.9 G/DL (ref 12–16)
MCH RBC QN AUTO: 28.1 PG (ref 26–34)
MCHC RBC AUTO-ENTMCNC: 31.1 G/DL (ref 32–36)
MCV RBC AUTO: 90 FL (ref 80–100)
NRBC BLD-RTO: 0 /100 WBCS (ref 0–0)
PLATELET # BLD AUTO: 209 X10*3/UL (ref 150–450)
POC HEMOGLOBIN A1C: 6.9 % (ref 4.2–6.5)
POTASSIUM SERPL-SCNC: 4.3 MMOL/L (ref 3.5–5.3)
PROT SERPL-MCNC: 6.7 G/DL (ref 6.4–8.2)
RBC # BLD AUTO: 3.88 X10*6/UL (ref 4–5.2)
SODIUM SERPL-SCNC: 139 MMOL/L (ref 136–145)
WBC # BLD AUTO: 6.9 X10*3/UL (ref 4.4–11.3)

## 2024-04-08 PROCEDURE — 99214 OFFICE O/P EST MOD 30 MIN: CPT | Performed by: INTERNAL MEDICINE

## 2024-04-08 PROCEDURE — 3079F DIAST BP 80-89 MM HG: CPT | Performed by: INTERNAL MEDICINE

## 2024-04-08 PROCEDURE — 1123F ACP DISCUSS/DSCN MKR DOCD: CPT | Performed by: INTERNAL MEDICINE

## 2024-04-08 PROCEDURE — 1158F ADVNC CARE PLAN TLK DOCD: CPT | Performed by: INTERNAL MEDICINE

## 2024-04-08 PROCEDURE — 1159F MED LIST DOCD IN RCRD: CPT | Performed by: INTERNAL MEDICINE

## 2024-04-08 PROCEDURE — 36415 COLL VENOUS BLD VENIPUNCTURE: CPT

## 2024-04-08 PROCEDURE — 80053 COMPREHEN METABOLIC PANEL: CPT

## 2024-04-08 PROCEDURE — 83036 HEMOGLOBIN GLYCOSYLATED A1C: CPT | Performed by: INTERNAL MEDICINE

## 2024-04-08 PROCEDURE — 1036F TOBACCO NON-USER: CPT | Performed by: INTERNAL MEDICINE

## 2024-04-08 PROCEDURE — 85027 COMPLETE CBC AUTOMATED: CPT

## 2024-04-08 PROCEDURE — 3074F SYST BP LT 130 MM HG: CPT | Performed by: INTERNAL MEDICINE

## 2024-04-08 ASSESSMENT — ENCOUNTER SYMPTOMS
PALPITATIONS: 0
ACTIVITY CHANGE: 0
SHORTNESS OF BREATH: 0
FREQUENCY: 0
DYSURIA: 0
BLOOD IN STOOL: 0
FLANK PAIN: 0
SORE THROAT: 0
UNEXPECTED WEIGHT CHANGE: 0
WHEEZING: 0
DECREASED CONCENTRATION: 0
HEADACHES: 0
BACK PAIN: 0
COUGH: 0
WEAKNESS: 0
ABDOMINAL PAIN: 0
SLEEP DISTURBANCE: 0
CHILLS: 0
NECK PAIN: 0
ARTHRALGIAS: 1
NERVOUS/ANXIOUS: 0
CHEST TIGHTNESS: 0
DIFFICULTY URINATING: 0
APPETITE CHANGE: 0
DIZZINESS: 0
LIGHT-HEADEDNESS: 0

## 2024-04-08 NOTE — PROGRESS NOTES
Subjective   Patient ID: Madalyn Chavez is a 78 y.o. female who presents for Follow-up (Pt present today for 5 month follow up. ls).    HPI  Madalyn Chavez is seen today for a follow-up visit.  Medical history is significant for diabetes, hypertension, hyperlipidemia, depression and acid reflux.  She was following with endocrinology who left practice.  She is currently taking metformin and repaglinide.  She states that she is not physically active.  She experiences occasional shortness of breath on exertion.  She has difficulty walking but denies any injuries or falls.    Review of Systems   Constitutional:  Negative for activity change, appetite change, chills and unexpected weight change.   HENT:  Negative for congestion, postnasal drip and sore throat.    Eyes:  Negative for visual disturbance.   Respiratory:  Negative for cough, chest tightness, shortness of breath and wheezing.    Cardiovascular:  Negative for chest pain, palpitations and leg swelling.   Gastrointestinal:  Negative for abdominal pain and blood in stool.   Endocrine: Negative for cold intolerance and heat intolerance.   Genitourinary:  Negative for difficulty urinating, dysuria, flank pain and frequency.   Musculoskeletal:  Positive for arthralgias and gait problem. Negative for back pain and neck pain.        Diff walking   Skin:  Negative for rash.   Allergic/Immunologic: Negative for environmental allergies and food allergies.   Neurological:  Negative for dizziness, weakness, light-headedness and headaches.   Psychiatric/Behavioral:  Negative for decreased concentration and sleep disturbance. The patient is not nervous/anxious.        Objective   /83   Temp 37 °C (98.6 °F)   Wt 84.8 kg (187 lb)   BMI 29.29 kg/m²     Physical Exam  Vitals reviewed.   Constitutional:       General: She is not in acute distress.     Appearance: Normal appearance.   HENT:      Head: Normocephalic and atraumatic.   Cardiovascular:      Rate  and Rhythm: Normal rate and regular rhythm.      Pulses: Normal pulses.   Pulmonary:      Effort: Pulmonary effort is normal. No respiratory distress.      Breath sounds: Normal breath sounds.   Abdominal:      General: Bowel sounds are normal. There is no distension.      Tenderness: There is no abdominal tenderness.   Musculoskeletal:         General: No swelling or tenderness. Normal range of motion.      Cervical back: Normal range of motion.   Feet:      Comments: Monofilament test- normal  Skin:     General: Skin is warm.   Neurological:      General: No focal deficit present.      Mental Status: She is alert.      Coordination: Coordination normal.      Gait: Gait normal.   Psychiatric:         Mood and Affect: Mood normal.         Behavior: Behavior normal.         Assessment/Plan   Diagnoses and all orders for this visit:  Benign essential hypertension  Comments:  Stable  Continue lisinopril, hydrochlorothiazide  Orders:  -     CBC; Future  -     Referral to Dermatology  Type 2 diabetes mellitus without complication, without long-term current use of insulin (CMS/[facility])  Comments:  Hemoglobin A1c today 6.9-elevated  Continue metformin, repaglinide  Make appointment with endocrinology  Orders:  -     POCT glycosylated hemoglobin (Hb A1C) manually resulted  -     Comprehensive Metabolic Panel; Future  -     Referral to Endocrinology; Future  Other hyperlipidemia  Comments:  Continue simvastatin  Recurrent major depressive disorder, in partial remission (CMS/[facility])  Comments:  Continue paroxetine    Blood work-CBC and CMP  Continue with regular physical activity for 20 to 30 minutes as tolerated  Hemoglobin A1c 6.9-elevated-eat healthy  Follow-up in 4 months or sooner if needed

## 2024-04-21 NOTE — PROGRESS NOTES
Urogynecology  Provider:  Olinda Borja MD  [phone]              ASSESSMENT AND PLAN:     Problem List Items Addressed This Visit    None          I spent a total of 10 minutes in face to face and non face to face time at this virtual visit.          Olinda Borja MD        HISTORY OF PRESENT ILLNESS:     S/p Intradetrusor Botox 100 U on 3/18/24   Checking on effects with lower dose.   \Had previously received 180 U in 9/2023      She is having significant leakage and is quite bothered by this.   The 100 U of Botox did not work at all she reports.    Her injection was 3/18 so she cannot be reinjected until 6/18/24 at the earliest.  We will appeal for a higher dose for her.  In the interim we will restart toviaz to control her symptoms until we can get approval and can reinject her.  Toviaz 8 mg script sent.    We will see her in late June for cysto/Botox injection    Record Review:     Prolapse Symptoms :     Urinary Symptoms:     Bowel Symptoms:     Sexual Activity:          Past Medical History:       Past Medical History:   Diagnosis Date    Dysphagia, unspecified 12/05/2013    Dysphagia    Encounter for screening mammogram for malignant neoplasm of breast     Visit for screening mammogram    Localized edema 08/11/2016    Edema extremities    Other specified complication of genitourinary prosthetic devices, implants and grafts, initial encounter (CMS-[facility]) 08/27/2018    Vaginal erosion secondary to pessary use, initial encounter    Personal history of diseases of the skin and subcutaneous tissue 09/05/2013    History of contact dermatitis    Personal history of other diseases of the circulatory system 10/31/2014    History of hypertension    Personal history of other diseases of the nervous system and sense organs     History of cataract    Personal history of other diseases of urinary system     History of bladder problems    Personal history of other mental and behavioral disorders 08/09/2013     History of anxiety disorder          Past Surgical History:       Past Surgical History:   Procedure Laterality Date    CATARACT EXTRACTION  10/31/2014    Cataract Surgery    COLONOSCOPY  03/06/2013    Complete Colonoscopy    DILATION AND CURETTAGE OF UTERUS  03/06/2013    Dilation And Curettage    DILATION AND CURETTAGE OF UTERUS  09/16/2016    Dilation And Curettage    HERNIA REPAIR  03/06/2013    Hernia Repair    HERNIA REPAIR  09/16/2016    Hernia Repair    OTHER SURGICAL HISTORY  08/30/2022    Cataract surgery    TONSILLECTOMY  10/31/2014    Tonsillectomy         Medications:       Prior to Admission medications    Medication Sig Start Date End Date Taking? Authorizing Provider   albuterol (ProAir HFA) 90 mcg/actuation inhaler Inhale. 3/4/17   Historical Provider, MD   aspirin 81 mg EC tablet Take 1 tablet (81 mg) by mouth once daily. 1/2/20   Historical Provider, MD   blood sugar diagnostic (OneTouch Verio test strips) strip     Historical Provider, MD   cholecalciferol (Vitamin D-3) 50 MCG (2000 UT) tablet Take by mouth.    Historical Provider, MD   diclofenac sodium (Voltaren) 1 % gel gel Apply 1 Application topically 2 times a day as needed.    Historical Provider, MD   fenofibrate (Tricor) 48 mg tablet TAKE 1 TABLET DAILY 11/30/23   Joe Anderson MD   FreeStyle glucose monitoring kit 1 each if needed.    Historical Provider, MD   hydroCHLOROthiazide (HYDRODiuril) 12.5 mg tablet TAKE 1 TABLET DAILY 1/2/24   Joe Anderson MD   hydroCHLOROthiazide (Microzide) 12.5 mg capsule Take by mouth.    Historical Provider, MD   lancets (OneTouch UltraSoft Lancets) misc     Historical Provider, MD   lisinopril 10 mg tablet TAKE 1 TABLET DAILY 12/21/23   Joe Anderson MD   metFORMIN (Glucophage) 500 mg tablet Take 1 tablet (500 mg) by mouth 2 times a day with meals.    Historical Provider, MD   Myrbetriq 50 mg tablet extended release 24 hr 24 hr tablet Take 1 tablet (50 mg) by mouth once daily. 10/17/23  10/16/24  Kalyani Barboza, APRN-CNP   omeprazole (PriLOSEC) 40 mg DR capsule 1 capsule (40 mg) once daily. Take before a meal   DUE TO SEE DOCTOR BEFORE NEXT REFILLS 2/9/24   Tray Jimenez MD   OneTouch Ultra Test strip     Historical Provider, MD   PARoxetine (Paxil) 40 mg tablet Take 1 tablet (40 mg) by mouth once daily.    Historical Provider, MD   repaglinide (Prandin) 1 mg tablet Take 1 tablet (1 mg) by mouth 3 times a day before meals. 2/27/24   Joe Anderson MD   repaglinide (Prandin) 2 mg tablet TAKE ONE TABLET BY MOUTH THREE TIMES A DAY 15 to 30 minutes before meals 11/21/22   Historical Provider, MD   Rybelsus 3 mg tablet Take 1 tablet (3 mg) by mouth once daily. 6/20/23   Historical Provider, MD   simvastatin (Zocor) 20 mg tablet TAKE 1 TABLET AT BEDTIME 1/2/24   Joe Anderson MD        ROS  Review of Systems       Data and DIAGNOSTIC STUDIES REVIEWED   No results found.   Lab Results   Component Value Date    URINECULTURE >100,000 Aerococcus urinae (A) 12/22/2023      Lab Results   Component Value Date    GLUCOSE 195 (H) 04/08/2024    CALCIUM 9.5 04/08/2024     04/08/2024    K 4.3 04/08/2024    CO2 27 04/08/2024     04/08/2024    BUN 21 04/08/2024    CREATININE 0.84 04/08/2024     Lab Results   Component Value Date    WBC 6.9 04/08/2024    HGB 10.9 (L) 04/08/2024    HCT 35.0 (L) 04/08/2024    MCV 90 04/08/2024     04/08/2024      Olinda Borja MD

## 2024-04-22 ENCOUNTER — TELEMEDICINE (OUTPATIENT)
Dept: OBSTETRICS AND GYNECOLOGY | Facility: CLINIC | Age: 79
End: 2024-04-22
Payer: MEDICARE

## 2024-04-22 DIAGNOSIS — N32.81 OVERACTIVE BLADDER: Primary | ICD-10-CM

## 2024-04-22 PROCEDURE — 1159F MED LIST DOCD IN RCRD: CPT | Performed by: OBSTETRICS & GYNECOLOGY

## 2024-04-22 PROCEDURE — 99212 OFFICE O/P EST SF 10 MIN: CPT | Performed by: OBSTETRICS & GYNECOLOGY

## 2024-04-22 PROCEDURE — 1123F ACP DISCUSS/DSCN MKR DOCD: CPT | Performed by: OBSTETRICS & GYNECOLOGY

## 2024-04-25 ENCOUNTER — TELEPHONE (OUTPATIENT)
Dept: OBSTETRICS AND GYNECOLOGY | Facility: CLINIC | Age: 79
End: 2024-04-25
Payer: MEDICARE

## 2024-04-25 DIAGNOSIS — N32.81 OVERACTIVE BLADDER: ICD-10-CM

## 2024-04-25 RX ORDER — FESOTERODINE FUMARATE 8 MG/1
8 TABLET, FILM COATED, EXTENDED RELEASE ORAL DAILY
Qty: 30 TABLET | Refills: 2 | Status: SHIPPED | OUTPATIENT
Start: 2024-04-25

## 2024-04-26 RX ORDER — FESOTERODINE FUMARATE 8 MG/1
8 TABLET, FILM COATED, EXTENDED RELEASE ORAL DAILY
Qty: 30 TABLET | Refills: 3 | Status: SHIPPED | OUTPATIENT
Start: 2024-04-26

## 2024-05-09 DIAGNOSIS — K21.00 GASTROESOPHAGEAL REFLUX DISEASE WITH ESOPHAGITIS WITHOUT HEMORRHAGE: ICD-10-CM

## 2024-05-09 RX ORDER — OMEPRAZOLE 40 MG/1
CAPSULE, DELAYED RELEASE ORAL
Qty: 90 CAPSULE | Refills: 3 | Status: SHIPPED | OUTPATIENT
Start: 2024-05-09

## 2024-05-09 RX ORDER — OMEPRAZOLE 40 MG/1
CAPSULE, DELAYED RELEASE ORAL
Qty: 90 CAPSULE | Refills: 0 | Status: SHIPPED | OUTPATIENT
Start: 2024-05-09 | End: 2024-05-09 | Stop reason: SDUPTHER

## 2024-05-24 ENCOUNTER — TELEPHONE (OUTPATIENT)
Dept: PRIMARY CARE | Facility: CLINIC | Age: 79
End: 2024-05-24
Payer: MEDICARE

## 2024-05-24 DIAGNOSIS — E08.00 DIABETES MELLITUS DUE TO UNDERLYING CONDITION WITH HYPEROSMOLARITY WITHOUT COMA, WITHOUT LONG-TERM CURRENT USE OF INSULIN (MULTI): ICD-10-CM

## 2024-05-24 DIAGNOSIS — F32.A DEPRESSION, UNSPECIFIED DEPRESSION TYPE: Primary | ICD-10-CM

## 2024-05-24 DIAGNOSIS — E08.00 DIABETES MELLITUS DUE TO UNDERLYING CONDITION WITH HYPEROSMOLARITY WITHOUT COMA, WITHOUT LONG-TERM CURRENT USE OF INSULIN (MULTI): Primary | ICD-10-CM

## 2024-05-24 RX ORDER — METFORMIN HYDROCHLORIDE 500 MG/1
500 TABLET ORAL
Qty: 60 TABLET | Refills: 3 | Status: SHIPPED | OUTPATIENT
Start: 2024-05-24 | End: 2024-05-24

## 2024-05-24 RX ORDER — METFORMIN HYDROCHLORIDE 500 MG/1
500 TABLET ORAL
Qty: 60 TABLET | Refills: 3 | Status: SHIPPED | OUTPATIENT
Start: 2024-05-24 | End: 2024-05-24 | Stop reason: SDUPTHER

## 2024-05-24 RX ORDER — METFORMIN HYDROCHLORIDE 500 MG/1
TABLET ORAL
Qty: 180 TABLET | Refills: 1 | Status: SHIPPED | OUTPATIENT
Start: 2024-05-24

## 2024-05-31 RX ORDER — PAROXETINE HYDROCHLORIDE 40 MG/1
40 TABLET, FILM COATED ORAL DAILY
Qty: 90 TABLET | Refills: 2 | Status: SHIPPED | OUTPATIENT
Start: 2024-05-31

## 2024-06-05 DIAGNOSIS — E11.9 TYPE 2 DIABETES MELLITUS WITHOUT COMPLICATION, WITHOUT LONG-TERM CURRENT USE OF INSULIN (MULTI): Primary | ICD-10-CM

## 2024-06-05 NOTE — TELEPHONE ENCOUNTER
Patients states that her mom is constantly throwing up she threw up all of he medication and is getting dehydrate she wants to know if you can call her in some Zofran to stop the vomiting but she wants to know if the Zofran will interact with any of her other medication she is concerned

## 2024-06-06 ENCOUNTER — APPOINTMENT (OUTPATIENT)
Dept: RADIOLOGY | Facility: HOSPITAL | Age: 79
End: 2024-06-06
Payer: MEDICARE

## 2024-06-06 ENCOUNTER — HOSPITAL ENCOUNTER (EMERGENCY)
Facility: HOSPITAL | Age: 79
Discharge: HOME | End: 2024-06-06
Attending: EMERGENCY MEDICINE
Payer: MEDICARE

## 2024-06-06 ENCOUNTER — APPOINTMENT (OUTPATIENT)
Dept: CARDIOLOGY | Facility: HOSPITAL | Age: 79
End: 2024-06-06
Payer: MEDICARE

## 2024-06-06 VITALS
DIASTOLIC BLOOD PRESSURE: 88 MMHG | HEIGHT: 67 IN | OXYGEN SATURATION: 100 % | TEMPERATURE: 98.3 F | HEART RATE: 82 BPM | BODY MASS INDEX: 28.25 KG/M2 | RESPIRATION RATE: 18 BRPM | WEIGHT: 180 LBS | SYSTOLIC BLOOD PRESSURE: 126 MMHG

## 2024-06-06 DIAGNOSIS — R11.2 NAUSEA AND VOMITING, UNSPECIFIED VOMITING TYPE: Primary | ICD-10-CM

## 2024-06-06 DIAGNOSIS — K80.20 CALCULUS OF GALLBLADDER WITHOUT CHOLECYSTITIS WITHOUT OBSTRUCTION: ICD-10-CM

## 2024-06-06 LAB
ALBUMIN SERPL BCP-MCNC: 4.1 G/DL (ref 3.4–5)
ALP SERPL-CCNC: 65 U/L (ref 33–136)
ALT SERPL W P-5'-P-CCNC: 13 U/L (ref 7–45)
ANION GAP SERPL CALC-SCNC: 15 MMOL/L (ref 10–20)
APPEARANCE UR: CLEAR
AST SERPL W P-5'-P-CCNC: 26 U/L (ref 9–39)
ATRIAL RATE: 85 BPM
BASOPHILS # BLD AUTO: 0.04 X10*3/UL (ref 0–0.1)
BASOPHILS NFR BLD AUTO: 0.6 %
BILIRUB SERPL-MCNC: 0.4 MG/DL (ref 0–1.2)
BILIRUB UR STRIP.AUTO-MCNC: NEGATIVE MG/DL
BUN SERPL-MCNC: 13 MG/DL (ref 6–23)
CALCIUM SERPL-MCNC: 9.8 MG/DL (ref 8.6–10.3)
CARDIAC TROPONIN I PNL SERPL HS: 8 NG/L (ref 0–13)
CARDIAC TROPONIN I PNL SERPL HS: 8 NG/L (ref 0–13)
CHLORIDE SERPL-SCNC: 101 MMOL/L (ref 98–107)
CO2 SERPL-SCNC: 27 MMOL/L (ref 21–32)
COLOR UR: ABNORMAL
CREAT SERPL-MCNC: 0.92 MG/DL (ref 0.5–1.05)
EGFRCR SERPLBLD CKD-EPI 2021: 64 ML/MIN/1.73M*2
EOSINOPHIL # BLD AUTO: 0.1 X10*3/UL (ref 0–0.4)
EOSINOPHIL NFR BLD AUTO: 1.4 %
ERYTHROCYTE [DISTWIDTH] IN BLOOD BY AUTOMATED COUNT: 12.9 % (ref 11.5–14.5)
GLUCOSE BLD MANUAL STRIP-MCNC: 132 MG/DL (ref 74–99)
GLUCOSE BLD MANUAL STRIP-MCNC: 99 MG/DL (ref 74–99)
GLUCOSE SERPL-MCNC: 128 MG/DL (ref 74–99)
GLUCOSE UR STRIP.AUTO-MCNC: NORMAL MG/DL
HCT VFR BLD AUTO: 35.2 % (ref 36–46)
HGB BLD-MCNC: 11.6 G/DL (ref 12–16)
IMM GRANULOCYTES # BLD AUTO: 0.02 X10*3/UL (ref 0–0.5)
IMM GRANULOCYTES NFR BLD AUTO: 0.3 % (ref 0–0.9)
KETONES UR STRIP.AUTO-MCNC: NEGATIVE MG/DL
LACTATE SERPL-SCNC: 1 MMOL/L (ref 0.4–2)
LEUKOCYTE ESTERASE UR QL STRIP.AUTO: ABNORMAL
LIPASE SERPL-CCNC: 24 U/L (ref 9–82)
LYMPHOCYTES # BLD AUTO: 2.07 X10*3/UL (ref 0.8–3)
LYMPHOCYTES NFR BLD AUTO: 29.2 %
MAGNESIUM SERPL-MCNC: 1.4 MG/DL (ref 1.6–2.4)
MCH RBC QN AUTO: 28.9 PG (ref 26–34)
MCHC RBC AUTO-ENTMCNC: 33 G/DL (ref 32–36)
MCV RBC AUTO: 88 FL (ref 80–100)
MONOCYTES # BLD AUTO: 0.4 X10*3/UL (ref 0.05–0.8)
MONOCYTES NFR BLD AUTO: 5.6 %
NEUTROPHILS # BLD AUTO: 4.46 X10*3/UL (ref 1.6–5.5)
NEUTROPHILS NFR BLD AUTO: 62.9 %
NITRITE UR QL STRIP.AUTO: NEGATIVE
NRBC BLD-RTO: 0 /100 WBCS (ref 0–0)
P AXIS: 56 DEGREES
P OFFSET: 175 MS
P ONSET: 126 MS
PH UR STRIP.AUTO: 7 [PH]
PLATELET # BLD AUTO: 211 X10*3/UL (ref 150–450)
POTASSIUM SERPL-SCNC: 3.5 MMOL/L (ref 3.5–5.3)
PR INTERVAL: 174 MS
PROT SERPL-MCNC: 6.9 G/DL (ref 6.4–8.2)
PROT UR STRIP.AUTO-MCNC: NEGATIVE MG/DL
Q ONSET: 213 MS
QRS COUNT: 14 BEATS
QRS DURATION: 88 MS
QT INTERVAL: 374 MS
QTC CALCULATION(BAZETT): 445 MS
QTC FREDERICIA: 420 MS
R AXIS: 14 DEGREES
RBC # BLD AUTO: 4.02 X10*6/UL (ref 4–5.2)
RBC # UR STRIP.AUTO: NEGATIVE /UL
RBC #/AREA URNS AUTO: NORMAL /HPF
S PYO DNA THROAT QL NAA+PROBE: NOT DETECTED
SARS-COV-2 RNA RESP QL NAA+PROBE: NOT DETECTED
SODIUM SERPL-SCNC: 139 MMOL/L (ref 136–145)
SP GR UR STRIP.AUTO: 1.01
T AXIS: 71 DEGREES
T OFFSET: 400 MS
UROBILINOGEN UR STRIP.AUTO-MCNC: NORMAL MG/DL
VENTRICULAR RATE: 85 BPM
WBC # BLD AUTO: 7.1 X10*3/UL (ref 4.4–11.3)
WBC #/AREA URNS AUTO: NORMAL /HPF

## 2024-06-06 PROCEDURE — 96375 TX/PRO/DX INJ NEW DRUG ADDON: CPT

## 2024-06-06 PROCEDURE — 82947 ASSAY GLUCOSE BLOOD QUANT: CPT | Mod: 59

## 2024-06-06 PROCEDURE — 84075 ASSAY ALKALINE PHOSPHATASE: CPT | Performed by: EMERGENCY MEDICINE

## 2024-06-06 PROCEDURE — 82947 ASSAY GLUCOSE BLOOD QUANT: CPT | Mod: 91

## 2024-06-06 PROCEDURE — 99285 EMERGENCY DEPT VISIT HI MDM: CPT | Mod: 25

## 2024-06-06 PROCEDURE — 83735 ASSAY OF MAGNESIUM: CPT | Performed by: EMERGENCY MEDICINE

## 2024-06-06 PROCEDURE — 36415 COLL VENOUS BLD VENIPUNCTURE: CPT | Performed by: EMERGENCY MEDICINE

## 2024-06-06 PROCEDURE — 96366 THER/PROPH/DIAG IV INF ADDON: CPT

## 2024-06-06 PROCEDURE — 2500000004 HC RX 250 GENERAL PHARMACY W/ HCPCS (ALT 636 FOR OP/ED)

## 2024-06-06 PROCEDURE — 84484 ASSAY OF TROPONIN QUANT: CPT | Mod: 91

## 2024-06-06 PROCEDURE — 96365 THER/PROPH/DIAG IV INF INIT: CPT

## 2024-06-06 PROCEDURE — 76705 ECHO EXAM OF ABDOMEN: CPT

## 2024-06-06 PROCEDURE — 36415 COLL VENOUS BLD VENIPUNCTURE: CPT

## 2024-06-06 PROCEDURE — 87651 STREP A DNA AMP PROBE: CPT

## 2024-06-06 PROCEDURE — 82947 ASSAY GLUCOSE BLOOD QUANT: CPT

## 2024-06-06 PROCEDURE — 85025 COMPLETE CBC W/AUTO DIFF WBC: CPT | Performed by: EMERGENCY MEDICINE

## 2024-06-06 PROCEDURE — 87635 SARS-COV-2 COVID-19 AMP PRB: CPT

## 2024-06-06 PROCEDURE — 76705 ECHO EXAM OF ABDOMEN: CPT | Mod: FOREIGN READ | Performed by: RADIOLOGY

## 2024-06-06 PROCEDURE — 93005 ELECTROCARDIOGRAM TRACING: CPT

## 2024-06-06 PROCEDURE — 83690 ASSAY OF LIPASE: CPT | Performed by: EMERGENCY MEDICINE

## 2024-06-06 PROCEDURE — 87086 URINE CULTURE/COLONY COUNT: CPT | Mod: AHULAB | Performed by: EMERGENCY MEDICINE

## 2024-06-06 PROCEDURE — 84484 ASSAY OF TROPONIN QUANT: CPT

## 2024-06-06 PROCEDURE — 96361 HYDRATE IV INFUSION ADD-ON: CPT

## 2024-06-06 PROCEDURE — 81001 URINALYSIS AUTO W/SCOPE: CPT | Performed by: EMERGENCY MEDICINE

## 2024-06-06 PROCEDURE — 83605 ASSAY OF LACTIC ACID: CPT | Performed by: EMERGENCY MEDICINE

## 2024-06-06 RX ORDER — ONDANSETRON HYDROCHLORIDE 2 MG/ML
4 INJECTION, SOLUTION INTRAVENOUS ONCE
Status: COMPLETED | OUTPATIENT
Start: 2024-06-06 | End: 2024-06-06

## 2024-06-06 RX ORDER — ONDANSETRON 4 MG/1
4 TABLET, ORALLY DISINTEGRATING ORAL EVERY 8 HOURS PRN
Qty: 15 TABLET | Refills: 0 | Status: SHIPPED | OUTPATIENT
Start: 2024-06-06 | End: 2024-06-10 | Stop reason: SDUPTHER

## 2024-06-06 RX ORDER — MAGNESIUM SULFATE HEPTAHYDRATE 40 MG/ML
2 INJECTION, SOLUTION INTRAVENOUS ONCE
Status: COMPLETED | OUTPATIENT
Start: 2024-06-06 | End: 2024-06-06

## 2024-06-06 RX ADMIN — ONDANSETRON 4 MG: 2 INJECTION INTRAMUSCULAR; INTRAVENOUS at 15:27

## 2024-06-06 RX ADMIN — SODIUM CHLORIDE 1000 ML: 9 INJECTION, SOLUTION INTRAVENOUS at 15:27

## 2024-06-06 RX ADMIN — MAGNESIUM SULFATE HEPTAHYDRATE 2 G: 40 INJECTION, SOLUTION INTRAVENOUS at 16:33

## 2024-06-06 ASSESSMENT — PAIN SCALES - GENERAL
PAINLEVEL_OUTOF10: 0 - NO PAIN
PAINLEVEL_OUTOF10: 0 - NO PAIN

## 2024-06-06 ASSESSMENT — COLUMBIA-SUICIDE SEVERITY RATING SCALE - C-SSRS
2. HAVE YOU ACTUALLY HAD ANY THOUGHTS OF KILLING YOURSELF?: NO
6. HAVE YOU EVER DONE ANYTHING, STARTED TO DO ANYTHING, OR PREPARED TO DO ANYTHING TO END YOUR LIFE?: NO
1. IN THE PAST MONTH, HAVE YOU WISHED YOU WERE DEAD OR WISHED YOU COULD GO TO SLEEP AND NOT WAKE UP?: NO

## 2024-06-06 ASSESSMENT — PAIN - FUNCTIONAL ASSESSMENT: PAIN_FUNCTIONAL_ASSESSMENT: 0-10

## 2024-06-06 NOTE — ED TRIAGE NOTES
PT TO ED FROM HOME WITH C/O N/V DIZZINESS WITHOUT ABD, CP, SOB WITH NORMAL URINARY PATTERN FOR 3 DAYS

## 2024-06-06 NOTE — ED PROVIDER NOTES
HPI   Chief Complaint   Patient presents with    Nausea    Vomiting       HPI  HISTORY OF PRESENT ILLNESS:  78 y.o. female presenting to the ED with complaint of nausea and vomiting for the past 3 days.  Patient states on Tuesday morning she woke up, took her morning medications, and vomited.  She states that she had multiple episodes of vomiting throughout the day Tuesday, unable to keep down food.  States that yesterday she was able to take her medications, ate some soup, drink fluids, and keep them down, was doing well throughout most of the day, but vomited again last night.  This morning she vomited after taking her morning medications, and again few hours ago.  No hematemesis.  She states she is nauseous and feels dehydrated.  She also reports mild chills, no fevers at home.  She endorses myalgias, generalized bodyaches, not localized to a specific area.  No extremity swelling, redness, rashes, or decreased range of motion.  She denies any significant abdominal pain.  She states that she had a normal bowel movement yesterday, no diarrhea.  No melena or hematochezia.  Denies urinary symptoms, no dysuria, hematuria, urinary urgency or frequency.  Denies chest pain or shortness of breath.  No syncope.  No blurry vision.  No neck pain or stiffness.  No back or flank pain.  No cough, sore throat, or nasal congestion.  Her grandson recently did have strep throat.  No other complaints or symptoms voiced.    12 point review of systems was performed and is negative unless otherwise specified in HPI.    PMH: DM2, HTN, GERD, obesity, remote hernia repair  Family history: noncontributory  Social history: non smoker, no ETOH, no illicit substances  Past Medical History:   Diagnosis Date    Dysphagia, unspecified 12/05/2013    Dysphagia    Encounter for screening mammogram for malignant neoplasm of breast     Visit for screening mammogram    Localized edema 08/11/2016    Edema extremities    Other specified complication of  genitourinary prosthetic devices, implants and grafts, initial encounter (CMS-HCC) 08/27/2018    Vaginal erosion secondary to pessary use, initial encounter    Personal history of diseases of the skin and subcutaneous tissue 09/05/2013    History of contact dermatitis    Personal history of other diseases of the circulatory system 10/31/2014    History of hypertension    Personal history of other diseases of the nervous system and sense organs     History of cataract    Personal history of other diseases of urinary system     History of bladder problems    Personal history of other mental and behavioral disorders 08/09/2013    History of anxiety disorder         Abnormal Labs Reviewed   CBC WITH AUTO DIFFERENTIAL - Abnormal; Notable for the following components:       Result Value    Hemoglobin 11.6 (*)     Hematocrit 35.2 (*)     All other components within normal limits   COMPREHENSIVE METABOLIC PANEL - Abnormal; Notable for the following components:    Glucose 128 (*)     All other components within normal limits   POCT GLUCOSE - Abnormal; Notable for the following components:    POCT Glucose 132 (*)     All other components within normal limits      US gallbladder    (Results Pending)               No data recorded                   Patient History   Past Medical History:   Diagnosis Date    Dysphagia, unspecified 12/05/2013    Dysphagia    Encounter for screening mammogram for malignant neoplasm of breast     Visit for screening mammogram    Localized edema 08/11/2016    Edema extremities    Other specified complication of genitourinary prosthetic devices, implants and grafts, initial encounter (CMS-HCC) 08/27/2018    Vaginal erosion secondary to pessary use, initial encounter    Personal history of diseases of the skin and subcutaneous tissue 09/05/2013    History of contact dermatitis    Personal history of other diseases of the circulatory system 10/31/2014    History of hypertension    Personal history of  other diseases of the nervous system and sense organs     History of cataract    Personal history of other diseases of urinary system     History of bladder problems    Personal history of other mental and behavioral disorders 08/09/2013    History of anxiety disorder     Past Surgical History:   Procedure Laterality Date    CATARACT EXTRACTION  10/31/2014    Cataract Surgery    COLONOSCOPY  03/06/2013    Complete Colonoscopy    DILATION AND CURETTAGE OF UTERUS  03/06/2013    Dilation And Curettage    DILATION AND CURETTAGE OF UTERUS  09/16/2016    Dilation And Curettage    HERNIA REPAIR  03/06/2013    Hernia Repair    HERNIA REPAIR  09/16/2016    Hernia Repair    OTHER SURGICAL HISTORY  08/30/2022    Cataract surgery    TONSILLECTOMY  10/31/2014    Tonsillectomy     Family History   Problem Relation Name Age of Onset    Hypertension Mother      Other (stroke syndrome) Mother      Other (cardiovascular disorder) Father      Coronary artery disease Brother       Social History     Tobacco Use    Smoking status: Never    Smokeless tobacco: Never   Substance Use Topics    Alcohol use: Not Currently    Drug use: Never       Physical Exam   ED Triage Vitals [06/06/24 1309]   Temperature Heart Rate Respirations BP   36.7 °C (98.1 °F) 90 15 149/84      Pulse Ox Temp src Heart Rate Source Patient Position   100 % -- -- --      BP Location FiO2 (%)     -- --       Physical Exam  Constitutional:       General: She is not in acute distress.     Appearance: She is not ill-appearing, toxic-appearing or diaphoretic.   HENT:      Head: Normocephalic and atraumatic.      Nose: No congestion.      Mouth/Throat:      Mouth: Mucous membranes are moist.   Eyes:      General: No scleral icterus.     Extraocular Movements: Extraocular movements intact.      Pupils: Pupils are equal, round, and reactive to light.   Cardiovascular:      Rate and Rhythm: Normal rate and regular rhythm.      Pulses: Normal pulses.   Pulmonary:      Effort:  Pulmonary effort is normal. No respiratory distress.   Abdominal:      General: There is no distension.      Palpations: Abdomen is soft.      Tenderness: There is no guarding or rebound.      Comments: Minimal epigastric tenderness.  No distention, no guarding or rigidity, no peritoneal signs.  No gross abdominal masses.  No pulsatile mass.   Musculoskeletal:         General: No tenderness. Normal range of motion.      Cervical back: Normal range of motion and neck supple. No rigidity.      Right lower leg: No edema.      Left lower leg: No edema.   Skin:     General: Skin is warm and dry.      Capillary Refill: Capillary refill takes less than 2 seconds.   Neurological:      General: No focal deficit present.      Mental Status: She is alert and oriented to person, place, and time.      Cranial Nerves: No cranial nerve deficit.      Coordination: Coordination normal.      Gait: Gait normal.   Psychiatric:         Mood and Affect: Mood normal.         Behavior: Behavior normal.         Judgment: Judgment normal.         ED Course & MDM   ED Course as of 06/06/24 1850   Thu Jun 06, 2024   1541 13:12 12 lead EKG interpreted by myself and my ED attending reveals sinus rhythm with a rate of 85 beats per minute.  Normal Axis.  There are no ST elevations or T wave inversions. No acute ischemic changes identified.  [EH]      ED Course User Index  [EH] Karen Pabon PA-C         Diagnoses as of 06/06/24 1850   Nausea and vomiting, unspecified vomiting type   Calculus of gallbladder without cholecystitis without obstruction       Medical Decision Making  ED course / MDM     Summary:  Patient presented with nausea and vomiting for the past 3 days.  Feels dehydrated, has had some bodyaches and mild chills.  Denies abdominal pain.  Vital signs stable, patient is overall well-appearing, ambulates unassisted, no acute distress.  On exam, there is minimal tenderness in the upper abdomen and epigastric area, no distention, no  guarding or rigidity, no peritoneal signs.  No CVA tenderness.  IV established, labs drawn.  Labs are reassuring, no leukocytosis, hemoglobin 11.6, hypomagnesemia of 1.40 which was repleted IV in the ED, glucose 128, no other electrolyte abnormalities.  Normal lactate and lipase.  EKG nonischemic, troponin is negative x 2.  Negative swabs for COVID and group A strep.  UA shows leukocyte esterase, otherwise normal.  Right upper quadrant ultrasound shows hepatic steatosis, cholelithiasis and common bile duct dilation, no wall thickening, no CBD stone.  Patient was given IV fluids and Zofran in the ED.  On reevaluation, feels significantly improved, is hungry.  She passed a p.o. challenge, drank fluids and ate snacks with no recurrence of vomiting.   Patient case discussed with ED attending Dr. Salas, who also saw and evaluated the patient. Results and differential were discussed in detail with the patient and family.  Symptoms have improved, passed a p.o. challenge, no leukocytosis, normal liver enzymes, has cholelithiasis but no evidence of biliary obstruction or cholecystitis at this time.  Did consider CT abdomen, as patient's tenderness is minimal, she has reassuring labs and normal vitals, and symptoms have improved, patient and family agree with deferring CT at this time.  They are eager for discharge.  Prescription for Zofran was sent to her pharmacy.  Instructed to follow-up with her PCP, as well as general surgery outpatient for evaluation of gallstones, and to return to the ED for any new or worsening symptoms. Passed ambulatory trial, ambulating unassisted with a steady gait. Eager for discharge. Stable for outpatient PCP and surgery follow up. Patient was given strict return precautions, understands reasons to return to the ED. Also discussed supportive care instructions. I expressed the importance of outpatient follow up with their PCP. All questions were answered, patient expressed understanding and  stated that they would comply.    Impression:  1. See diagnosis     Disposition: Discharge    Patient seen and discussed with Dr. Salas    This note has been transcribed using voice recognition and may contain grammatical errors, misplaced words, incorrect words, incorrect phrases or other errors.   Procedure  Procedures     Karen Pabon PA-C  06/06/24 0306

## 2024-06-07 LAB — HOLD SPECIMEN: NORMAL

## 2024-06-07 RX ORDER — BLOOD SUGAR DIAGNOSTIC
102 STRIP MISCELLANEOUS 2 TIMES DAILY
Qty: 102 STRIP | Refills: 3 | Status: SHIPPED | OUTPATIENT
Start: 2024-06-07

## 2024-06-08 LAB — BACTERIA UR CULT: NORMAL

## 2024-06-10 ENCOUNTER — OFFICE VISIT (OUTPATIENT)
Dept: PRIMARY CARE | Facility: CLINIC | Age: 79
End: 2024-06-10
Payer: MEDICARE

## 2024-06-10 VITALS
WEIGHT: 180 LBS | BODY MASS INDEX: 28.19 KG/M2 | TEMPERATURE: 97.3 F | SYSTOLIC BLOOD PRESSURE: 122 MMHG | DIASTOLIC BLOOD PRESSURE: 74 MMHG | HEART RATE: 87 BPM

## 2024-06-10 DIAGNOSIS — K80.20 CALCULUS OF GALLBLADDER WITHOUT CHOLECYSTITIS WITHOUT OBSTRUCTION: ICD-10-CM

## 2024-06-10 DIAGNOSIS — N28.1 RENAL CYST: ICD-10-CM

## 2024-06-10 DIAGNOSIS — E11.9 TYPE 2 DIABETES MELLITUS WITHOUT COMPLICATION, WITHOUT LONG-TERM CURRENT USE OF INSULIN (MULTI): Primary | ICD-10-CM

## 2024-06-10 DIAGNOSIS — R11.2 NAUSEA AND VOMITING, UNSPECIFIED VOMITING TYPE: ICD-10-CM

## 2024-06-10 LAB — POC HEMOGLOBIN A1C: 6.8 % (ref 4.2–6.5)

## 2024-06-10 PROCEDURE — 1123F ACP DISCUSS/DSCN MKR DOCD: CPT | Performed by: INTERNAL MEDICINE

## 2024-06-10 PROCEDURE — 3078F DIAST BP <80 MM HG: CPT | Performed by: INTERNAL MEDICINE

## 2024-06-10 PROCEDURE — 83036 HEMOGLOBIN GLYCOSYLATED A1C: CPT | Performed by: INTERNAL MEDICINE

## 2024-06-10 PROCEDURE — 99214 OFFICE O/P EST MOD 30 MIN: CPT | Performed by: INTERNAL MEDICINE

## 2024-06-10 PROCEDURE — 3074F SYST BP LT 130 MM HG: CPT | Performed by: INTERNAL MEDICINE

## 2024-06-10 RX ORDER — ONDANSETRON 4 MG/1
4 TABLET, ORALLY DISINTEGRATING ORAL EVERY 12 HOURS PRN
Qty: 30 TABLET | Refills: 1 | Status: SHIPPED | OUTPATIENT
Start: 2024-06-10 | End: 2024-06-15

## 2024-06-10 RX ORDER — BLOOD-GLUCOSE METER
1 EACH MISCELLANEOUS DAILY
Qty: 100 EACH | Refills: 3 | Status: SHIPPED | OUTPATIENT
Start: 2024-06-10

## 2024-06-10 ASSESSMENT — ENCOUNTER SYMPTOMS
SHORTNESS OF BREATH: 0
FREQUENCY: 0
SLEEP DISTURBANCE: 0
FLANK PAIN: 0
ABDOMINAL PAIN: 0
CHEST TIGHTNESS: 0
PALPITATIONS: 0
DIZZINESS: 0
CHILLS: 0
DYSURIA: 0
VOMITING: 1
NERVOUS/ANXIOUS: 0
SORE THROAT: 0
UNEXPECTED WEIGHT CHANGE: 0
DIFFICULTY URINATING: 0
NECK PAIN: 0
DECREASED CONCENTRATION: 0
WEAKNESS: 0
APPETITE CHANGE: 0
LIGHT-HEADEDNESS: 0
NAUSEA: 1
BACK PAIN: 0
WHEEZING: 0
ACTIVITY CHANGE: 0
ARTHRALGIAS: 0
BLOOD IN STOOL: 0
HEADACHES: 0
COUGH: 0

## 2024-06-10 NOTE — PROGRESS NOTES
Subjective   Patient ID: Madalyn Chavez is a 78 y.o. female who presents for Follow-up (ER followup).    HPI patient is seen today for follow-up after ER visit. She was experiencing nausea and vomiting last week when she went to ER. She was given Zofran, IV fluids. She had ultrasound of her abdomen done that showed cholelithiasis, fatty liver and right kidney cyst.  She states that she is feeling slightly better and still experiences occasional abdominal discomfort. Denies any nausea or vomiting. Her appetite is slowly improving.  Her diabetes she is currently taking her repaglinide, metformin. Current dose of metformin 1000 BID.    Review of Systems   Constitutional:  Negative for activity change, appetite change, chills and unexpected weight change.   HENT:  Negative for congestion, postnasal drip and sore throat.    Eyes:  Negative for visual disturbance.   Respiratory:  Negative for cough, chest tightness, shortness of breath and wheezing.    Cardiovascular:  Negative for chest pain, palpitations and leg swelling.   Gastrointestinal:  Positive for nausea and vomiting. Negative for abdominal pain and blood in stool.   Endocrine: Negative for cold intolerance and heat intolerance.   Genitourinary:  Negative for difficulty urinating, dysuria, flank pain and frequency.   Musculoskeletal:  Negative for arthralgias, back pain, gait problem and neck pain.   Skin:  Negative for rash.   Allergic/Immunologic: Negative for environmental allergies and food allergies.   Neurological:  Negative for dizziness, weakness, light-headedness and headaches.   Psychiatric/Behavioral:  Negative for decreased concentration and sleep disturbance. The patient is not nervous/anxious.        Objective   /74 (BP Location: Right arm, Patient Position: Sitting)   Pulse 87   Temp 36.3 °C (97.3 °F)   Wt 81.6 kg (180 lb)   BMI 28.19 kg/m²     Physical Exam  Vitals reviewed.   Constitutional:       General: She is not in acute  distress.     Appearance: Normal appearance.   HENT:      Head: Normocephalic and atraumatic.   Cardiovascular:      Rate and Rhythm: Normal rate and regular rhythm.      Pulses: Normal pulses.   Pulmonary:      Effort: Pulmonary effort is normal. No respiratory distress.      Breath sounds: Normal breath sounds.   Abdominal:      General: Bowel sounds are normal. There is no distension.      Tenderness: There is no abdominal tenderness.   Musculoskeletal:         General: No swelling or tenderness. Normal range of motion.      Cervical back: Normal range of motion.      Comments: Left leg edema with slight redness   Skin:     General: Skin is warm.   Neurological:      General: No focal deficit present.      Mental Status: She is alert.      Coordination: Coordination normal.      Gait: Gait normal.   Psychiatric:         Mood and Affect: Mood normal.         Behavior: Behavior normal.         Assessment/Plan   Diagnoses and all orders for this visit:  Type 2 diabetes mellitus without complication, without long-term current use of insulin (Multi)  Comments:  Hemoglobin A1c today 6.8  continue repaglinide  lower metformin to 500 BID  Orders:  -     blood sugar diagnostic (OneTouch Verio test strips) strip; 1 each once daily.  -     POCT glycosylated hemoglobin (Hb A1C) manually resulted  Nausea and vomiting, unspecified vomiting type  Comments:  Recently our visit for nausea and vomiting-multifactorial  cholelithiasis, metformin dose  lower the dose of metformin to 500 BID  take Zofran as needed  Orders:  -     ondansetron ODT (Zofran-ODT) 4 mg disintegrating tablet; Take 1 tablet (4 mg) by mouth every 12 hours if needed for nausea or vomiting for up to 5 days.  Renal cyst  Comments:  Incidental find of right renal cells-complex  refer to urology  Orders:  -     Referral to Urology; Future  Calculus of gallbladder without cholecystitis without obstruction  Comments:  Refer to general surgery  Other orders  -      Referral to Primary Care  follow-up in 2 to 3 months

## 2024-06-14 LAB
ATRIAL RATE: 85 BPM
P AXIS: 56 DEGREES
P OFFSET: 175 MS
P ONSET: 126 MS
PR INTERVAL: 174 MS
Q ONSET: 213 MS
QRS COUNT: 14 BEATS
QRS DURATION: 88 MS
QT INTERVAL: 374 MS
QTC CALCULATION(BAZETT): 445 MS
QTC FREDERICIA: 420 MS
R AXIS: 14 DEGREES
T AXIS: 71 DEGREES
T OFFSET: 400 MS
VENTRICULAR RATE: 85 BPM

## 2024-06-17 ENCOUNTER — APPOINTMENT (OUTPATIENT)
Dept: OBSTETRICS AND GYNECOLOGY | Facility: CLINIC | Age: 79
End: 2024-06-17
Payer: MEDICARE

## 2024-06-19 NOTE — PROGRESS NOTES
HPI:  Pt here for intradetrusor Botox injection    Last injection 3/2024 100 U  The patient gave a urine sample which was checked for infection and found to be negative.  Pt was numbed for 20 min with lidojet inserted in to the bladder and given 100 mg po pyridium.    The patient was consented for cytoscopic intradetrusor Botox injection.        Units was reconstituted in     mL of NS  Using a flexible scope injection was performed at 4 sites using the Laborie needle at a 3 mm depth starting in the midline just above the intraureteric ridge and then to the left and right of this site and then just above until the entire drug volume was injected followed by 1 mL NS flush.    No bleeding was noted    The procedure was completed, the patient allowed to empty her bladder and get dressed.    Post-procedure precautions were given to the patient and macrobid 100 mg po BID x 3 days script sent.    She will follow-up with Kalyani Barboza in 1 month.    [facility]

## 2024-06-20 ENCOUNTER — PROCEDURE VISIT (OUTPATIENT)
Dept: OBSTETRICS AND GYNECOLOGY | Facility: CLINIC | Age: 79
End: 2024-06-20
Payer: MEDICARE

## 2024-06-20 VITALS
HEIGHT: 67 IN | WEIGHT: 187.8 LBS | SYSTOLIC BLOOD PRESSURE: 120 MMHG | BODY MASS INDEX: 29.47 KG/M2 | HEART RATE: 92 BPM | DIASTOLIC BLOOD PRESSURE: 68 MMHG

## 2024-06-20 DIAGNOSIS — N32.81 OVERACTIVE BLADDER: Primary | ICD-10-CM

## 2024-06-20 LAB
POC APPEARANCE, URINE: CLEAR
POC BILIRUBIN, URINE: NEGATIVE
POC BLOOD, URINE: NEGATIVE
POC COLOR, URINE: YELLOW
POC GLUCOSE, URINE: NEGATIVE MG/DL
POC KETONES, URINE: NEGATIVE MG/DL
POC LEUKOCYTES, URINE: NEGATIVE
POC NITRITE,URINE: NEGATIVE
POC PH, URINE: 5.5 PH
POC PROTEIN, URINE: NEGATIVE MG/DL
POC SPECIFIC GRAVITY, URINE: 1.02
POC UROBILINOGEN, URINE: 0.2 EU/DL

## 2024-06-20 PROCEDURE — 99213 OFFICE O/P EST LOW 20 MIN: CPT | Performed by: OBSTETRICS & GYNECOLOGY

## 2024-06-20 PROCEDURE — 2500000002 HC RX 250 W HCPCS SELF ADMINISTERED DRUGS (ALT 637 FOR MEDICARE OP, ALT 636 FOR OP/ED): Performed by: OBSTETRICS & GYNECOLOGY

## 2024-06-20 PROCEDURE — A4216 STERILE WATER/SALINE, 10 ML: HCPCS | Performed by: OBSTETRICS & GYNECOLOGY

## 2024-06-20 PROCEDURE — 52287 CYSTOSCOPY CHEMODENERVATION: CPT | Performed by: OBSTETRICS & GYNECOLOGY

## 2024-06-20 PROCEDURE — 2500000005 HC RX 250 GENERAL PHARMACY W/O HCPCS: Performed by: OBSTETRICS & GYNECOLOGY

## 2024-06-20 PROCEDURE — 81003 URINALYSIS AUTO W/O SCOPE: CPT | Mod: QW | Performed by: OBSTETRICS & GYNECOLOGY

## 2024-06-20 PROCEDURE — 2500000004 HC RX 250 GENERAL PHARMACY W/ HCPCS (ALT 636 FOR OP/ED): Performed by: OBSTETRICS & GYNECOLOGY

## 2024-06-20 PROCEDURE — 96372 THER/PROPH/DIAG INJ SC/IM: CPT | Performed by: OBSTETRICS & GYNECOLOGY

## 2024-06-20 RX ORDER — SODIUM CHLORIDE 9 MG/ML
10 INJECTION, SOLUTION INTRAMUSCULAR; INTRAVENOUS; SUBCUTANEOUS ONCE
Status: COMPLETED | OUTPATIENT
Start: 2024-06-20 | End: 2024-06-20

## 2024-06-20 RX ORDER — PHENAZOPYRIDINE HYDROCHLORIDE 200 MG/1
200 TABLET, FILM COATED ORAL ONCE
Status: COMPLETED | OUTPATIENT
Start: 2024-06-20 | End: 2024-06-20

## 2024-06-20 RX ORDER — LIDOCAINE HYDROCHLORIDE 20 MG/ML
1 JELLY TOPICAL ONCE
Status: COMPLETED | OUTPATIENT
Start: 2024-06-20 | End: 2024-06-20

## 2024-06-20 RX ORDER — LIDOCAINE HYDROCHLORIDE 10 MG/ML
5 INJECTION INFILTRATION; PERINEURAL ONCE
Status: COMPLETED | OUTPATIENT
Start: 2024-06-20 | End: 2024-06-20

## 2024-06-20 RX ORDER — GRANULES FOR ORAL 3 G/1
3 POWDER ORAL ONCE
Qty: 3 G | Refills: 0 | Status: SHIPPED | OUTPATIENT
Start: 2024-06-20 | End: 2024-06-20

## 2024-06-22 DIAGNOSIS — E78.49 OTHER HYPERLIPIDEMIA: ICD-10-CM

## 2024-06-22 DIAGNOSIS — I10 PRIMARY HYPERTENSION: ICD-10-CM

## 2024-06-24 RX ORDER — HYDROCHLOROTHIAZIDE 12.5 MG/1
TABLET ORAL
Qty: 90 TABLET | Refills: 1 | Status: SHIPPED | OUTPATIENT
Start: 2024-06-24

## 2024-06-24 RX ORDER — SIMVASTATIN 20 MG/1
TABLET, FILM COATED ORAL
Qty: 90 TABLET | Refills: 1 | Status: SHIPPED | OUTPATIENT
Start: 2024-06-24

## 2024-06-26 ENCOUNTER — OFFICE VISIT (OUTPATIENT)
Dept: SURGERY | Facility: CLINIC | Age: 79
End: 2024-06-26
Payer: MEDICARE

## 2024-06-26 VITALS
TEMPERATURE: 97 F | HEIGHT: 67 IN | DIASTOLIC BLOOD PRESSURE: 74 MMHG | BODY MASS INDEX: 30.17 KG/M2 | HEART RATE: 95 BPM | WEIGHT: 192.2 LBS | SYSTOLIC BLOOD PRESSURE: 137 MMHG

## 2024-06-26 DIAGNOSIS — K80.20: Primary | ICD-10-CM

## 2024-06-26 PROCEDURE — 99213 OFFICE O/P EST LOW 20 MIN: CPT | Performed by: SURGERY

## 2024-06-26 PROCEDURE — 1123F ACP DISCUSS/DSCN MKR DOCD: CPT | Performed by: SURGERY

## 2024-06-26 PROCEDURE — 3075F SYST BP GE 130 - 139MM HG: CPT | Performed by: SURGERY

## 2024-06-26 PROCEDURE — 99203 OFFICE O/P NEW LOW 30 MIN: CPT | Performed by: SURGERY

## 2024-06-26 PROCEDURE — 1036F TOBACCO NON-USER: CPT | Performed by: SURGERY

## 2024-06-26 PROCEDURE — 3078F DIAST BP <80 MM HG: CPT | Performed by: SURGERY

## 2024-06-26 PROCEDURE — 1126F AMNT PAIN NOTED NONE PRSNT: CPT | Performed by: SURGERY

## 2024-06-26 PROCEDURE — 1159F MED LIST DOCD IN RCRD: CPT | Performed by: SURGERY

## 2024-06-26 ASSESSMENT — PAIN SCALES - GENERAL: PAINLEVEL: 0-NO PAIN

## 2024-06-26 ASSESSMENT — ENCOUNTER SYMPTOMS: DEPRESSION: 0

## 2024-06-26 NOTE — PROGRESS NOTES
Subjective   Patient ID: Madalyn Chavez is a 78 y.o. female who presents for No chief complaint on file..  HPI  Patient is a 78-year-old female was referred for evaluation and treatment of symptomatic gallstones.  She presented to the emergency department on 6/6/2024 with 3-day complaint of nausea and vomiting.  She denied having any abdominal pain.  Workup found her to be dehydrated.  She had a right upper quadrant ultrasound that showed gallstones without signs of acute cholecystitis.    She is referred for consideration of cholecystectomy.  She comes in with her .  At this point she is doing very well and is back to her baseline    Review of Systems     On review of systems patient denies any weight loss, fever, change in bowel habits, melena, hematochezia, coronary artery disease,  , , TIA/CVA, bleeding, jaundice, pancreatitis, hepatitis.    Patient does not smoke. Patient does not drink      Past Medical History:   Diagnosis Date    Dysphagia, unspecified 12/05/2013    Dysphagia    Encounter for screening mammogram for malignant neoplasm of breast     Visit for screening mammogram    Localized edema 08/11/2016    Edema extremities    Other specified complication of genitourinary prosthetic devices, implants and grafts, initial encounter (CMS-[facility]) 08/27/2018    Vaginal erosion secondary to pessary use, initial encounter    Personal history of diseases of the skin and subcutaneous tissue 09/05/2013    History of contact dermatitis    Personal history of other diseases of the circulatory system 10/31/2014    History of hypertension    Personal history of other diseases of the nervous system and sense organs     History of cataract    Personal history of other diseases of urinary system     History of bladder problems    Personal history of other mental and behavioral disorders 08/09/2013    History of anxiety disorder    PMH:  DM, HTN, GERD,     Past Surgical History:   Procedure Laterality Date     CATARACT EXTRACTION  10/31/2014    Cataract Surgery    COLONOSCOPY  03/06/2013    Complete Colonoscopy    DILATION AND CURETTAGE OF UTERUS  03/06/2013    Dilation And Curettage    DILATION AND CURETTAGE OF UTERUS  09/16/2016    Dilation And Curettage    HERNIA REPAIR  03/06/2013    Hernia Repair    HERNIA REPAIR  09/16/2016    Hernia Repair    OTHER SURGICAL HISTORY  08/30/2022    Cataract surgery    TONSILLECTOMY  10/31/2014    Tonsillectomy            Objective   === 06/06/24 ===    US GALLBLADDER    - Impression -  Diffuse hepatic steatosis.  Cholelithiasis and common bile duct dilated.  No gallbladder wall  thickening. No common duct stone is identified.  3.3 cm complex cyst on the right kidney.    Lab Results   Component Value Date    GLUCOSE 128 (H) 06/06/2024     06/06/2024    K 3.5 06/06/2024     06/06/2024    CO2 27 06/06/2024    ANIONGAP 15 06/06/2024    BUN 13 06/06/2024    CREATININE 0.92 06/06/2024    EGFR 64 06/06/2024    CALCIUM 9.8 06/06/2024    ALBUMIN 4.1 06/06/2024    ALKPHOS 65 06/06/2024    PROT 6.9 06/06/2024    AST 26 06/06/2024    BILITOT 0.4 06/06/2024    ALT 13 06/06/2024     Lab Results   Component Value Date    WBC 7.1 06/06/2024    HGB 11.6 (L) 06/06/2024    HCT 35.2 (L) 06/06/2024    MCV 88 06/06/2024     06/06/2024         Physical Exam    Well-nourished, well-developed. In no distress  Alert and oriented x 3  Lungs are clear to auscultation bilaterally.  Cardiac exam is regular rhythm and rate.  Abdomen is soft nontender nondistended.  Neurologic exam is without focal deficit.   Assessment/Plan       Impression: Patient 77-year-old female recently presented to emergency department with nausea and vomiting.  Symptoms suggestive of gastroenteritis with dehydration.  Appears to be less likely biliary colic and related to gallstones.    At this time holding recommendation for cholecystectomy.  Of asked him to follow-up with me in 4 months, or sooner should she develop  signs and symptoms of biliary colic.  She and her  are pleased with this plan in that she is somewhat reluctant to consider surgery

## 2024-06-26 NOTE — LETTER
June 26, 2024     Joe Anderson MD  [address] [facility] [id number]    Patient: Madalyn Chavez   YOB: 1945   Date of Visit: 6/26/2024       Dear Dr. Joe Anderson MD:    Thank you for referring Madalyn Chavez to me for evaluation. Below are my notes for this consultation.  If you have questions, please do not hesitate to call me. I look forward to following your patient along with you.       Sincerely,     Marty Lawler MD      CC: No Recipients  ______________________________________________________________________________________    Subjective  Patient ID: Madalyn Chavez is a 78 y.o. female who presents for No chief complaint on file..  HPI  Patient is a 78-year-old female was referred for evaluation and treatment of symptomatic gallstones.  She presented to the emergency department on 6/6/2024 with 3-day complaint of nausea and vomiting.  She denied having any abdominal pain.  Workup found her to be dehydrated.  She had a right upper quadrant ultrasound that showed gallstones without signs of acute cholecystitis.    She is referred for consideration of cholecystectomy.  She comes in with her .  At this point she is doing very well and is back to her baseline    Review of Systems     On review of systems patient denies any weight loss, fever, change in bowel habits, melena, hematochezia, coronary artery disease,  , , TIA/CVA, bleeding, jaundice, pancreatitis, hepatitis.    Patient does not smoke. Patient does not drink      Past Medical History:   Diagnosis Date   • Dysphagia, unspecified 12/05/2013    Dysphagia   • Encounter for screening mammogram for malignant neoplasm of breast     Visit for screening mammogram   • Localized edema 08/11/2016    Edema extremities   • Other specified complication of genitourinary prosthetic devices, implants and grafts, initial encounter (CMS-[facility]) 08/27/2018    Vaginal erosion secondary to pessary use, initial encounter   •  Personal history of diseases of the skin and subcutaneous tissue 09/05/2013    History of contact dermatitis   • Personal history of other diseases of the circulatory system 10/31/2014    History of hypertension   • Personal history of other diseases of the nervous system and sense organs     History of cataract   • Personal history of other diseases of urinary system     History of bladder problems   • Personal history of other mental and behavioral disorders 08/09/2013    History of anxiety disorder    PMH:  DM, HTN, GERD,     Past Surgical History:   Procedure Laterality Date   • CATARACT EXTRACTION  10/31/2014    Cataract Surgery   • COLONOSCOPY  03/06/2013    Complete Colonoscopy   • DILATION AND CURETTAGE OF UTERUS  03/06/2013    Dilation And Curettage   • DILATION AND CURETTAGE OF UTERUS  09/16/2016    Dilation And Curettage   • HERNIA REPAIR  03/06/2013    Hernia Repair   • HERNIA REPAIR  09/16/2016    Hernia Repair   • OTHER SURGICAL HISTORY  08/30/2022    Cataract surgery   • TONSILLECTOMY  10/31/2014    Tonsillectomy            Objective  === 06/06/24 ===    US GALLBLADDER    - Impression -  Diffuse hepatic steatosis.  Cholelithiasis and common bile duct dilated.  No gallbladder wall  thickening. No common duct stone is identified.  3.3 cm complex cyst on the right kidney.    Lab Results   Component Value Date    GLUCOSE 128 (H) 06/06/2024     06/06/2024    K 3.5 06/06/2024     06/06/2024    CO2 27 06/06/2024    ANIONGAP 15 06/06/2024    BUN 13 06/06/2024    CREATININE 0.92 06/06/2024    EGFR 64 06/06/2024    CALCIUM 9.8 06/06/2024    ALBUMIN 4.1 06/06/2024    ALKPHOS 65 06/06/2024    PROT 6.9 06/06/2024    AST 26 06/06/2024    BILITOT 0.4 06/06/2024    ALT 13 06/06/2024     Lab Results   Component Value Date    WBC 7.1 06/06/2024    HGB 11.6 (L) 06/06/2024    HCT 35.2 (L) 06/06/2024    MCV 88 06/06/2024     06/06/2024         Physical Exam    Well-nourished, well-developed. In no  distress  Alert and oriented x 3  Lungs are clear to auscultation bilaterally.  Cardiac exam is regular rhythm and rate.  Abdomen is soft nontender nondistended.  Neurologic exam is without focal deficit.   Assessment/Plan      Impression: Patient 77-year-old female recently presented to emergency department with nausea and vomiting.  Symptoms suggestive of gastroenteritis with dehydration.  Appears to be less likely biliary colic and related to gallstones.    At this time holding recommendation for cholecystectomy.  Of asked him to follow-up with me in 4 months, or sooner should she develop signs and symptoms of biliary colic.  She and her  are pleased with this plan in that she is somewhat reluctant to consider surgery

## 2024-07-03 ENCOUNTER — APPOINTMENT (OUTPATIENT)
Dept: UROLOGY | Facility: CLINIC | Age: 79
End: 2024-07-03
Payer: MEDICARE

## 2024-07-03 VITALS — HEIGHT: 67 IN | TEMPERATURE: 95.3 F | WEIGHT: 192 LBS | BODY MASS INDEX: 30.13 KG/M2

## 2024-07-03 DIAGNOSIS — N28.1 RENAL CYST: ICD-10-CM

## 2024-07-03 DIAGNOSIS — E11.9 TYPE 2 DIABETES MELLITUS WITHOUT COMPLICATION, WITHOUT LONG-TERM CURRENT USE OF INSULIN (MULTI): ICD-10-CM

## 2024-07-03 PROCEDURE — 99203 OFFICE O/P NEW LOW 30 MIN: CPT | Performed by: UROLOGY

## 2024-07-03 PROCEDURE — 1036F TOBACCO NON-USER: CPT | Performed by: UROLOGY

## 2024-07-03 PROCEDURE — 1123F ACP DISCUSS/DSCN MKR DOCD: CPT | Performed by: UROLOGY

## 2024-07-03 PROCEDURE — 1159F MED LIST DOCD IN RCRD: CPT | Performed by: UROLOGY

## 2024-07-03 PROCEDURE — 1126F AMNT PAIN NOTED NONE PRSNT: CPT | Performed by: UROLOGY

## 2024-07-03 RX ORDER — DIAZEPAM 5 MG/1
5 TABLET ORAL ONCE
Qty: 1 TABLET | Refills: 0 | Status: SHIPPED | OUTPATIENT
Start: 2024-07-03 | End: 2024-07-03

## 2024-07-03 ASSESSMENT — PAIN SCALES - GENERAL: PAINLEVEL: 0-NO PAIN

## 2024-07-03 NOTE — PROGRESS NOTES
Subjective   Madalyn Chavez is a 78 y.o. female referred by Dr. Joe Anderson for evaluation of  renal cyst.    I personally reviewed her gallbladder US which  showed  that her right kidney measures 9.2 cm in length.  Renal cortical echo texture is normal.  There is no hydronephrosis.  There are no stones.  There is a complex cyst within the upper pole measuring 3.3 x 2.8 x 3.4 cm.    Past Medical History:   Diagnosis Date    Dysphagia, unspecified 12/05/2013    Dysphagia    Encounter for screening mammogram for malignant neoplasm of breast     Visit for screening mammogram    Localized edema 08/11/2016    Edema extremities    Other specified complication of genitourinary prosthetic devices, implants and grafts, initial encounter (CMS-[facility] [place]) 08/27/2018    Vaginal erosion secondary to pessary use, initial encounter    Personal history of diseases of the skin and subcutaneous tissue 09/05/2013    History of contact dermatitis    Personal history of other diseases of the circulatory system 10/31/2014    History of hypertension    Personal history of other diseases of the nervous system and sense organs     History of cataract    Personal history of other diseases of urinary system     History of bladder problems    Personal history of other mental and behavioral disorders 08/09/2013    History of anxiety disorder     Past Surgical History:   Procedure Laterality Date    CATARACT EXTRACTION  10/31/2014    Cataract Surgery    COLONOSCOPY  03/06/2013    Complete Colonoscopy    DILATION AND CURETTAGE OF UTERUS  03/06/2013    Dilation And Curettage    DILATION AND CURETTAGE OF UTERUS  09/16/2016    Dilation And Curettage    HERNIA REPAIR  03/06/2013    Hernia Repair    HERNIA REPAIR  09/16/2016    Hernia Repair    OTHER SURGICAL HISTORY  08/30/2022    Cataract surgery    TONSILLECTOMY  10/31/2014    Tonsillectomy     Family History   Problem Relation Name Age of Onset    Hypertension Mother      Other (stroke  syndrome) Mother      Other (cardiovascular disorder) Father      Coronary artery disease Brother       Current Outpatient Medications   Medication Sig Dispense Refill    albuterol (ProAir HFA) 90 mcg/actuation inhaler Inhale.      aspirin 81 mg EC tablet Take 1 tablet (81 mg) by mouth once daily.      blood sugar diagnostic (OneTouch Verio test strips) strip       blood sugar diagnostic (OneTouch Verio test strips) strip 1 each once daily. 100 each 3    cholecalciferol (Vitamin D-3) 50 MCG (2000 UT) tablet Take by mouth.      diclofenac sodium (Voltaren) 1 % gel gel Apply 4.5 inches (4 g) topically 2 times a day as needed.      fenofibrate (Tricor) 48 mg tablet TAKE 1 TABLET DAILY 90 tablet 3    fesoterodine (Toviaz) 8 mg tablet extended release 24 hr Take 1 tablet (8 mg) by mouth early in the morning.. (Patient not taking: Reported on 6/26/2024) 30 tablet 3    fesoterodine (Toviaz) 8 mg tablet extended release 24 hr Take 1 tablet (8 mg) by mouth once daily. (Patient not taking: Reported on 6/26/2024) 30 tablet 2    FreeStyle glucose monitoring kit 1 each if needed.      hydroCHLOROthiazide (Microzide) 12.5 mg capsule Take by mouth.      hydroCHLOROthiazide (Microzide) 12.5 mg tablet TAKE 1 TABLET DAILY 90 tablet 1    lancets (OneTouch UltraSoft Lancets) misc       lisinopril 10 mg tablet TAKE 1 TABLET DAILY 90 tablet 2    metFORMIN (Glucophage) 500 mg tablet TAKE 1 TABLET BY MOUTH TWICE DAILY( MORNING AND LATE AFTERNOON) 180 tablet 1    Myrbetriq 50 mg tablet extended release 24 hr 24 hr tablet Take 1 tablet (50 mg) by mouth once daily. 90 tablet 3    omeprazole (PriLOSEC) 40 mg DR capsule TAKE 1 CAPSULE DAILY BEFORE A MEAL **DUE TO SEE DOCTOR BEFORE NEXT REFILL** 90 capsule 3    OneTouch Ultra Test strip 102 strips 2 times a day. 102 strip 3    PARoxetine (Paxil) 40 mg tablet Take 1 tablet (40 mg) by mouth once daily. 90 tablet 2    repaglinide (Prandin) 1 mg tablet Take 1 tablet (1 mg) by mouth 3 times a day  before meals. 90 tablet 3    repaglinide (Prandin) 2 mg tablet TAKE ONE TABLET BY MOUTH THREE TIMES A DAY 15 to 30 minutes before meals      Rybelsus 3 mg tablet Take 1 tablet (3 mg) by mouth once daily.      simvastatin (Zocor) 20 mg tablet TAKE 1 TABLET AT BEDTIME 90 tablet 1     No current facility-administered medications for this visit.     Allergies   Allergen Reactions    Penicillins Shortness of breath, Other and Unknown     Flushing, respiratory distress    Sulfa (Sulfonamide Antibiotics) Shortness of breath, Other and Unknown     Flushing, respiratory     Bee Venom Protein (Honey Bee) Unknown    Ciprofloxacin Unknown    Codeine Unknown    Doxycycline Unknown    Erythromycin Unknown    Methylchloroisothiazolinone Unknown    Nitrofurantoin Monohyd/M-Cryst Unknown, Myalgia and Nausea/vomiting    Oxycodone-Acetaminophen Unknown    Prednisone Unknown     Social History     Socioeconomic History    Marital status:      Spouse name: Not on file    Number of children: Not on file    Years of education: Not on file    Highest education level: Not on file   Occupational History    Not on file   Tobacco Use    Smoking status: Never    Smokeless tobacco: Never   Substance and Sexual Activity    Alcohol use: Not Currently    Drug use: Never    Sexual activity: Not on file   Other Topics Concern    Not on file   Social History Narrative    Not on file     Social Determinants of Health     Financial Resource Strain: Not on file   Food Insecurity: Not on file   Transportation Needs: Not on file   Physical Activity: Not on file   Stress: Not on file   Social Connections: Not on file   Intimate Partner Violence: Not on file   Housing Stability: Not on file       Review of Systems  Pertinent items are noted in HPI.    Objective       Lab Review  Lab Results   Component Value Date    WBC 7.1 06/06/2024    RBC 4.02 06/06/2024    HGB 11.6 (L) 06/06/2024    HCT 35.2 (L) 06/06/2024     06/06/2024      Lab Results    Component Value Date    BUN 13 06/06/2024    CREATININE 0.92 06/06/2024          Assessment/Plan   Diagnoses and all orders for this visit:  Renal cyst  Comments:  Incidental find of right renal cells-complex  refer to urology  Orders:  -     Referral to Urology    Complex Renal Cyst   We will get an MRI for further evaluation of both Kidneys.    She will require a Valium an hour before the test. Rx provided.     Follow up with the imaging.     All questions were answered to the patient's satisfaction. Patient agrees with the plan and wishes to proceed. Follow-up will be scheduled appropriately.     Scribe Attestation  By signing my name below, I, Yas Shanks attest that this documentation has been prepared under the direction and in the presence of Savana Mahoney MD. All medical record entries made by the Scribe were at my direction or personally dictated by me. I have reviewed the chart and agree that the record accurately reflects my personal performance of the history, physical exam, discussion and plan.

## 2024-07-11 DIAGNOSIS — E11.9 TYPE 2 DIABETES MELLITUS WITHOUT COMPLICATION, WITHOUT LONG-TERM CURRENT USE OF INSULIN (MULTI): Primary | ICD-10-CM

## 2024-07-15 ENCOUNTER — TELEPHONE (OUTPATIENT)
Dept: OBSTETRICS AND GYNECOLOGY | Facility: CLINIC | Age: 79
End: 2024-07-15
Payer: MEDICARE

## 2024-07-15 DIAGNOSIS — N32.81 OVERACTIVE BLADDER: ICD-10-CM

## 2024-07-15 DIAGNOSIS — R39.9 UTI SYMPTOMS: ICD-10-CM

## 2024-07-15 RX ORDER — REPAGLINIDE 1 MG/1
1 TABLET ORAL
Qty: 90 TABLET | Refills: 3 | Status: SHIPPED | OUTPATIENT
Start: 2024-07-15

## 2024-07-16 ENCOUNTER — LAB (OUTPATIENT)
Dept: LAB | Facility: LAB | Age: 79
End: 2024-07-16
Payer: MEDICARE

## 2024-07-16 DIAGNOSIS — R39.9 UTI SYMPTOMS: ICD-10-CM

## 2024-07-16 PROCEDURE — 87086 URINE CULTURE/COLONY COUNT: CPT

## 2024-07-16 PROCEDURE — 87186 SC STD MICRODIL/AGAR DIL: CPT

## 2024-07-19 ENCOUNTER — TELEPHONE (OUTPATIENT)
Dept: OBSTETRICS AND GYNECOLOGY | Facility: CLINIC | Age: 79
End: 2024-07-19
Payer: MEDICARE

## 2024-07-19 DIAGNOSIS — N39.0 ACUTE LOWER UTI: ICD-10-CM

## 2024-07-19 LAB — BACTERIA UR CULT: ABNORMAL

## 2024-07-19 NOTE — TELEPHONE ENCOUNTER
Madalyn Chavez informed of urine culture results and Antibiotic: Fosfomycin sent to local pharmacy per Dr. Borja.

## 2024-07-21 RX ORDER — GRANULES FOR ORAL 3 G/1
3 POWDER ORAL ONCE
Qty: 3 G | Refills: 0 | Status: SHIPPED | OUTPATIENT
Start: 2024-07-21 | End: 2024-07-21

## 2024-07-22 ENCOUNTER — TELEPHONE (OUTPATIENT)
Dept: OBSTETRICS AND GYNECOLOGY | Facility: CLINIC | Age: 79
End: 2024-07-22
Payer: MEDICARE

## 2024-07-22 NOTE — TELEPHONE ENCOUNTER
----- Message from Olinda Borja sent at 7/19/2024  9:31 AM EDT -----  IS she symptomatic? Can she wait over weekend to be treated? IF not fosfomycin 3 g po x 1 dose.

## 2024-07-25 ENCOUNTER — APPOINTMENT (OUTPATIENT)
Dept: RADIOLOGY | Facility: HOSPITAL | Age: 79
End: 2024-07-25
Payer: MEDICARE

## 2024-08-02 ENCOUNTER — HOSPITAL ENCOUNTER (OUTPATIENT)
Dept: RADIOLOGY | Facility: HOSPITAL | Age: 79
Discharge: HOME | End: 2024-08-02
Payer: MEDICARE

## 2024-08-02 DIAGNOSIS — N28.1 RENAL CYST: ICD-10-CM

## 2024-08-02 PROCEDURE — A9575 INJ GADOTERATE MEGLUMI 0.1ML: HCPCS | Performed by: UROLOGY

## 2024-08-02 PROCEDURE — 2550000001 HC RX 255 CONTRASTS: Performed by: UROLOGY

## 2024-08-02 PROCEDURE — 74183 MRI ABD W/O CNTR FLWD CNTR: CPT

## 2024-08-02 RX ORDER — GADOTERATE MEGLUMINE 376.9 MG/ML
18 INJECTION INTRAVENOUS
Status: COMPLETED | OUTPATIENT
Start: 2024-08-02 | End: 2024-08-02

## 2024-08-05 ENCOUNTER — LAB (OUTPATIENT)
Dept: LAB | Facility: LAB | Age: 79
End: 2024-08-05
Payer: MEDICARE

## 2024-08-05 ENCOUNTER — APPOINTMENT (OUTPATIENT)
Dept: UROLOGY | Facility: CLINIC | Age: 79
End: 2024-08-05
Payer: MEDICARE

## 2024-08-05 ENCOUNTER — TELEPHONE (OUTPATIENT)
Dept: OBSTETRICS AND GYNECOLOGY | Facility: CLINIC | Age: 79
End: 2024-08-05

## 2024-08-05 DIAGNOSIS — R39.9 SYMPTOMS OF URINARY TRACT INFECTION: ICD-10-CM

## 2024-08-05 NOTE — TELEPHONE ENCOUNTER
Order for Urinalysis with Reflex Culture and Microscopic placed per protocol for signs and symptoms of UTI.

## 2024-08-06 ENCOUNTER — APPOINTMENT (OUTPATIENT)
Dept: LAB | Facility: LAB | Age: 79
End: 2024-08-06
Payer: MEDICARE

## 2024-08-06 LAB
APPEARANCE UR: ABNORMAL
BACTERIA #/AREA URNS AUTO: ABNORMAL /HPF
BILIRUB UR STRIP.AUTO-MCNC: NEGATIVE MG/DL
COLOR UR: COLORLESS
GLUCOSE UR STRIP.AUTO-MCNC: NORMAL MG/DL
KETONES UR STRIP.AUTO-MCNC: NEGATIVE MG/DL
LEUKOCYTE ESTERASE UR QL STRIP.AUTO: ABNORMAL
NITRITE UR QL STRIP.AUTO: ABNORMAL
PH UR STRIP.AUTO: 7.5 [PH]
PROT UR STRIP.AUTO-MCNC: ABNORMAL MG/DL
RBC # UR STRIP.AUTO: ABNORMAL /UL
RBC #/AREA URNS AUTO: ABNORMAL /HPF
SP GR UR STRIP.AUTO: 1.01
UROBILINOGEN UR STRIP.AUTO-MCNC: NORMAL MG/DL
WBC #/AREA URNS AUTO: >50 /HPF
WBC CLUMPS #/AREA URNS AUTO: ABNORMAL /HPF

## 2024-08-06 PROCEDURE — 87086 URINE CULTURE/COLONY COUNT: CPT

## 2024-08-06 PROCEDURE — 87186 SC STD MICRODIL/AGAR DIL: CPT

## 2024-08-06 PROCEDURE — 81001 URINALYSIS AUTO W/SCOPE: CPT

## 2024-08-07 LAB — HOLD SPECIMEN: NORMAL

## 2024-08-08 ENCOUNTER — TELEPHONE (OUTPATIENT)
Dept: OBSTETRICS AND GYNECOLOGY | Facility: CLINIC | Age: 79
End: 2024-08-08
Payer: MEDICARE

## 2024-08-09 ENCOUNTER — TELEPHONE (OUTPATIENT)
Dept: OBSTETRICS AND GYNECOLOGY | Facility: CLINIC | Age: 79
End: 2024-08-09
Payer: MEDICARE

## 2024-08-09 DIAGNOSIS — N39.0 ACUTE LOWER UTI: Primary | ICD-10-CM

## 2024-08-09 LAB — BACTERIA UR CULT: ABNORMAL

## 2024-08-09 RX ORDER — GRANULES FOR ORAL 3 G/1
3 POWDER ORAL ONCE
Qty: 3 G | Refills: 1 | Status: SHIPPED | OUTPATIENT
Start: 2024-08-09 | End: 2024-08-09

## 2024-08-09 NOTE — TELEPHONE ENCOUNTER
Madalyn Chavez informed of urine culture results and that rx was sent for Antibiotic: Fosfomycin. Questions answered and pt verbalized understanding.

## 2024-08-09 NOTE — TELEPHONE ENCOUNTER
----- Message from Olinda Borja sent at 8/9/2024  2:04 PM EDT -----  Please notify pt that fosfomycin sent to her [facility] pharmacy

## 2024-08-13 ENCOUNTER — APPOINTMENT (OUTPATIENT)
Dept: UROLOGY | Facility: CLINIC | Age: 79
End: 2024-08-13
Payer: MEDICARE

## 2024-08-13 VITALS — TEMPERATURE: 97.5 F

## 2024-08-13 DIAGNOSIS — N28.1 RENAL CYST: Primary | ICD-10-CM

## 2024-08-13 PROCEDURE — G2211 COMPLEX E/M VISIT ADD ON: HCPCS | Performed by: UROLOGY

## 2024-08-13 PROCEDURE — 1159F MED LIST DOCD IN RCRD: CPT | Performed by: UROLOGY

## 2024-08-13 PROCEDURE — 1126F AMNT PAIN NOTED NONE PRSNT: CPT | Performed by: UROLOGY

## 2024-08-13 PROCEDURE — 1123F ACP DISCUSS/DSCN MKR DOCD: CPT | Performed by: UROLOGY

## 2024-08-13 PROCEDURE — 1036F TOBACCO NON-USER: CPT | Performed by: UROLOGY

## 2024-08-13 PROCEDURE — 99213 OFFICE O/P EST LOW 20 MIN: CPT | Performed by: UROLOGY

## 2024-08-13 ASSESSMENT — PAIN SCALES - GENERAL: PAINLEVEL: 0-NO PAIN

## 2024-08-13 NOTE — PROGRESS NOTES
History of Present Illness (HPI):  TODAY: (08/13/24)  Madalyn Chavez is a 78 y.o. female with history of complex renal cyst. She presents today for follow-up and to discuss her recent MRI kidney results from 8/5/24.     Past Medical History:   Diagnosis Date    Dysphagia, unspecified 12/05/2013    Dysphagia    Encounter for screening mammogram for malignant neoplasm of breast     Visit for screening mammogram    Localized edema 08/11/2016    Edema extremities    Other specified complication of genitourinary prosthetic devices, implants and grafts, initial encounter (CMS-HCC) 08/27/2018    Vaginal erosion secondary to pessary use, initial encounter    Personal history of diseases of the skin and subcutaneous tissue 09/05/2013    History of contact dermatitis    Personal history of other diseases of the circulatory system 10/31/2014    History of hypertension    Personal history of other diseases of the nervous system and sense organs     History of cataract    Personal history of other diseases of urinary system     History of bladder problems    Personal history of other mental and behavioral disorders 08/09/2013    History of anxiety disorder     Past Surgical History:   Procedure Laterality Date    CATARACT EXTRACTION  10/31/2014    Cataract Surgery    COLONOSCOPY  03/06/2013    Complete Colonoscopy    DILATION AND CURETTAGE OF UTERUS  03/06/2013    Dilation And Curettage    DILATION AND CURETTAGE OF UTERUS  09/16/2016    Dilation And Curettage    HERNIA REPAIR  03/06/2013    Hernia Repair    HERNIA REPAIR  09/16/2016    Hernia Repair    OTHER SURGICAL HISTORY  08/30/2022    Cataract surgery    TONSILLECTOMY  10/31/2014    Tonsillectomy     Family History   Problem Relation Name Age of Onset    Hypertension Mother      Other (stroke syndrome) Mother      Other (cardiovascular disorder) Father      Coronary artery disease Brother       Social History     Tobacco Use   Smoking Status Never   Smokeless Tobacco  Never     Current Outpatient Medications   Medication Sig Dispense Refill    albuterol (ProAir HFA) 90 mcg/actuation inhaler Inhale.      aspirin 81 mg EC tablet Take 1 tablet (81 mg) by mouth once daily.      blood sugar diagnostic (OneTouch Verio test strips) strip       blood sugar diagnostic (OneTouch Verio test strips) strip 1 each once daily. 100 each 3    cholecalciferol (Vitamin D-3) 50 MCG (2000 UT) tablet Take by mouth.      diazePAM (Valium) 5 mg tablet Take 1 tablet (5 mg) by mouth 1 time for 1 dose. 1 tablet 0    diclofenac sodium (Voltaren) 1 % gel gel Apply 4.5 inches (4 g) topically 2 times a day as needed.      fenofibrate (Tricor) 48 mg tablet TAKE 1 TABLET DAILY 90 tablet 3    fesoterodine (Toviaz) 8 mg tablet extended release 24 hr Take 1 tablet (8 mg) by mouth early in the morning.. (Patient not taking: Reported on 6/26/2024) 30 tablet 3    fesoterodine (Toviaz) 8 mg tablet extended release 24 hr Take 1 tablet (8 mg) by mouth once daily. (Patient not taking: Reported on 6/26/2024) 30 tablet 2    FreeStyle glucose monitoring kit 1 each if needed.      hydroCHLOROthiazide (Microzide) 12.5 mg capsule Take by mouth.      hydroCHLOROthiazide (Microzide) 12.5 mg tablet TAKE 1 TABLET DAILY 90 tablet 1    lancets (OneTouch UltraSoft Lancets) misc       lisinopril 10 mg tablet TAKE 1 TABLET DAILY 90 tablet 2    metFORMIN (Glucophage) 500 mg tablet TAKE 1 TABLET BY MOUTH TWICE DAILY( MORNING AND LATE AFTERNOON) 180 tablet 1    Myrbetriq 50 mg tablet extended release 24 hr 24 hr tablet Take 1 tablet (50 mg) by mouth once daily. 90 tablet 3    omeprazole (PriLOSEC) 40 mg DR capsule TAKE 1 CAPSULE DAILY BEFORE A MEAL **DUE TO SEE DOCTOR BEFORE NEXT REFILL** 90 capsule 3    OneTouch Ultra Test strip 102 strips 2 times a day. 102 strip 3    PARoxetine (Paxil) 40 mg tablet Take 1 tablet (40 mg) by mouth once daily. 90 tablet 2    repaglinide (Prandin) 1 mg tablet Take 1 tablet (1 mg) by mouth 3 times a day  before meals. 90 tablet 3    repaglinide (Prandin) 2 mg tablet TAKE ONE TABLET BY MOUTH THREE TIMES A DAY 15 to 30 minutes before meals      Rybelsus 3 mg tablet Take 1 tablet (3 mg) by mouth once daily.      simvastatin (Zocor) 20 mg tablet TAKE 1 TABLET AT BEDTIME 90 tablet 1     No current facility-administered medications for this visit.     Allergies   Allergen Reactions    Penicillins Shortness of breath, Other and Unknown     Flushing, respiratory distress    Sulfa (Sulfonamide Antibiotics) Shortness of breath, Other and Unknown     Flushing, respiratory     Bee Venom Protein (Honey Bee) Unknown    Ciprofloxacin Unknown    Codeine Unknown    Doxycycline Unknown    Erythromycin Unknown    Methylchloroisothiazolinone Unknown    Nitrofurantoin Monohyd/M-Cryst Unknown, Myalgia and Nausea/vomiting    Oxycodone-Acetaminophen Unknown    Prednisone Unknown     Past medical, surgical, family and social history in the chart was reviewed and is accurate including any additions to what is in this HPI.    Review of systems (ROS):   Pertinent information as listed in the HPI.        Objective   Visit Vitals  Temp 36.4 °C (97.5 °F)       Lab Review:  Lab Results   Component Value Date    WBC 7.1 06/06/2024    RBC 4.02 06/06/2024    HGB 11.6 (L) 06/06/2024    HCT 35.2 (L) 06/06/2024     06/06/2024      Lab Results   Component Value Date    BUN 13 06/06/2024    CREATININE 0.92 06/06/2024      Lab Results   Component Value Date    HGBA1C 6.8 (A) 06/10/2024     Lab Results   Component Value Date    CHOL 115 07/28/2023    TRIG 146 07/28/2023    HDL 56.1 07/28/2023    ALT 13 06/06/2024    AST 26 06/06/2024     06/06/2024    K 3.5 06/06/2024     06/06/2024    CO2 27 06/06/2024    TSH 0.99 07/28/2023        ASSESSMENT:  Problem List Items Addressed This Visit       Renal cyst - Primary      PLAN:  Complex renal cyst    I personally reviewed the MRI kidney with and without contrast results from 8/5/24 which showed  right renal upper pole posterolateral aspect partially exophytic 4 cm mildly lobulated minimally complex cyst with thin intrinsic septation slightly larger compared to cyst seen on prior CT of 2016 but still benign. No intrinsic nodular enhancement (Bosniak 2).     No further follow up is needed    All questions were answered to the patient's satisfaction. Patient agrees with the plan and wishes to proceed. Follow-up with me as needed.     I spent 20 minutes of dedicated E&M time, including preparation and review of records, notes, and data, time spent with patient/family, and documentation.     E&M visit today is associated with current or anticipated ongoing medical care services related to a patient’s single, serious condition or a complex condition.    By signing my name below, IShilpi Scribe   attest that this documentation has been prepared under the direction and in the presence of Savana Mahoney MD.

## 2024-08-21 DIAGNOSIS — E11.9 TYPE 2 DIABETES MELLITUS WITHOUT COMPLICATION, WITHOUT LONG-TERM CURRENT USE OF INSULIN (MULTI): ICD-10-CM

## 2024-08-21 RX ORDER — REPAGLINIDE 1 MG/1
1 TABLET ORAL
Qty: 90 TABLET | Refills: 3 | Status: SHIPPED | OUTPATIENT
Start: 2024-08-21

## 2024-08-26 ENCOUNTER — APPOINTMENT (OUTPATIENT)
Dept: PRIMARY CARE | Facility: CLINIC | Age: 79
End: 2024-08-26
Payer: MEDICARE

## 2024-08-26 VITALS
BODY MASS INDEX: 29.95 KG/M2 | DIASTOLIC BLOOD PRESSURE: 78 MMHG | HEART RATE: 94 BPM | OXYGEN SATURATION: 98 % | SYSTOLIC BLOOD PRESSURE: 135 MMHG | WEIGHT: 191.2 LBS

## 2024-08-26 DIAGNOSIS — E78.49 OTHER HYPERLIPIDEMIA: ICD-10-CM

## 2024-08-26 DIAGNOSIS — I10 BENIGN ESSENTIAL HYPERTENSION: ICD-10-CM

## 2024-08-26 DIAGNOSIS — F32.A DEPRESSION, UNSPECIFIED DEPRESSION TYPE: ICD-10-CM

## 2024-08-26 DIAGNOSIS — E11.9 TYPE 2 DIABETES MELLITUS WITHOUT COMPLICATION, WITHOUT LONG-TERM CURRENT USE OF INSULIN (MULTI): Primary | ICD-10-CM

## 2024-08-26 PROCEDURE — 99213 OFFICE O/P EST LOW 20 MIN: CPT | Performed by: INTERNAL MEDICINE

## 2024-08-26 PROCEDURE — 3075F SYST BP GE 130 - 139MM HG: CPT | Performed by: INTERNAL MEDICINE

## 2024-08-26 PROCEDURE — 3078F DIAST BP <80 MM HG: CPT | Performed by: INTERNAL MEDICINE

## 2024-08-26 PROCEDURE — 1123F ACP DISCUSS/DSCN MKR DOCD: CPT | Performed by: INTERNAL MEDICINE

## 2024-08-26 PROCEDURE — 1126F AMNT PAIN NOTED NONE PRSNT: CPT | Performed by: INTERNAL MEDICINE

## 2024-08-26 RX ORDER — METFORMIN HYDROCHLORIDE 500 MG/1
1000 TABLET ORAL
Qty: 180 TABLET | Refills: 1 | Status: SHIPPED | OUTPATIENT
Start: 2024-08-26

## 2024-08-26 RX ORDER — FLASH GLUCOSE SENSOR
KIT MISCELLANEOUS
Qty: 1 EACH | Refills: 0 | Status: SHIPPED | OUTPATIENT
Start: 2024-08-26

## 2024-08-26 RX ORDER — REPAGLINIDE 1 MG/1
1 TABLET ORAL
Qty: 270 TABLET | Refills: 1 | Status: SHIPPED | OUTPATIENT
Start: 2024-08-26

## 2024-08-26 ASSESSMENT — ENCOUNTER SYMPTOMS
BLOOD IN STOOL: 0
ABDOMINAL PAIN: 0
HEADACHES: 0
PALPITATIONS: 0
ARTHRALGIAS: 1
WHEEZING: 0
NECK PAIN: 0
LIGHT-HEADEDNESS: 0
DIFFICULTY URINATING: 0
COUGH: 0
NERVOUS/ANXIOUS: 0
SORE THROAT: 0
FATIGUE: 0
SLEEP DISTURBANCE: 0
DIZZINESS: 0
SHORTNESS OF BREATH: 0
CHILLS: 0
WEAKNESS: 0
DYSURIA: 0
DECREASED CONCENTRATION: 0
FREQUENCY: 0
UNEXPECTED WEIGHT CHANGE: 0
CHEST TIGHTNESS: 0
FLANK PAIN: 0
BACK PAIN: 0

## 2024-08-26 ASSESSMENT — PAIN SCALES - GENERAL: PAINLEVEL: 0-NO PAIN

## 2024-08-27 ENCOUNTER — APPOINTMENT (OUTPATIENT)
Dept: OPHTHALMOLOGY | Facility: CLINIC | Age: 79
End: 2024-08-27
Payer: MEDICARE

## 2024-08-27 DIAGNOSIS — H26.491 PCO (POSTERIOR CAPSULAR OPACIFICATION), RIGHT: ICD-10-CM

## 2024-08-27 DIAGNOSIS — Z96.1 PSEUDOPHAKIA: ICD-10-CM

## 2024-08-27 DIAGNOSIS — H04.123 DRY EYES, BILATERAL: ICD-10-CM

## 2024-08-27 DIAGNOSIS — H52.03 HYPEROPIA, BILATERAL: ICD-10-CM

## 2024-08-27 DIAGNOSIS — E11.9 DIABETES MELLITUS WITHOUT COMPLICATION (MULTI): Primary | ICD-10-CM

## 2024-08-27 DIAGNOSIS — H26.492 PCO (POSTERIOR CAPSULAR OPACIFICATION), LEFT: ICD-10-CM

## 2024-08-27 DIAGNOSIS — H52.4 PRESBYOPIA: ICD-10-CM

## 2024-08-27 PROCEDURE — 92014 COMPRE OPH EXAM EST PT 1/>: CPT | Performed by: OPHTHALMOLOGY

## 2024-08-27 ASSESSMENT — CONF VISUAL FIELD
OS_INFERIOR_NASAL_RESTRICTION: 0
OS_NORMAL: 1
OD_INFERIOR_NASAL_RESTRICTION: 0
OS_SUPERIOR_NASAL_RESTRICTION: 0
OS_SUPERIOR_TEMPORAL_RESTRICTION: 0
OD_SUPERIOR_NASAL_RESTRICTION: 0
OD_SUPERIOR_TEMPORAL_RESTRICTION: 0
OS_INFERIOR_TEMPORAL_RESTRICTION: 0
OD_NORMAL: 1
OD_INFERIOR_TEMPORAL_RESTRICTION: 0

## 2024-08-27 ASSESSMENT — REFRACTION_WEARINGRX
SPECS_TYPE: BIFOCAL
OD_ADD: +1.00
OD_CYLINDER: SPHER
OS_CYLINDER: -0.50
OD_SPHERE: +1.25
OS_SPHERE: +1.50
OS_AXIS: 045
OS_ADD: +1.00

## 2024-08-27 ASSESSMENT — ENCOUNTER SYMPTOMS: EYES NEGATIVE: 1

## 2024-08-27 ASSESSMENT — TONOMETRY
IOP_METHOD: GOLDMANN APPLANATION
OD_IOP_MMHG: 13
OS_IOP_MMHG: 14

## 2024-08-27 ASSESSMENT — REFRACTION_MANIFEST
OS_AXIS: 045
OS_SPHERE: +1.50
OD_CYLINDER: SPHERE
OD_ADD: +1.25
OS_ADD: +1.25
OS_CYLINDER: -0.50
OD_SPHERE: +1.50

## 2024-08-27 ASSESSMENT — VISUAL ACUITY
OS_CC: 20/30
OS_CC+: -1
OD_CC+: -1
OD_CC: 20/25
OD_BAT_MED: 20/30
CORRECTION_TYPE: GLASSES
METHOD: SNELLEN - LINEAR

## 2024-08-27 ASSESSMENT — CUP TO DISC RATIO
OS_RATIO: .45
OD_RATIO: .45

## 2024-08-27 ASSESSMENT — EXTERNAL EXAM - LEFT EYE: OS_EXAM: NORMAL

## 2024-08-27 ASSESSMENT — EXTERNAL EXAM - RIGHT EYE: OD_EXAM: NORMAL

## 2024-08-27 NOTE — PROGRESS NOTES
Diabetes [id number]  -HbA1c= 6.8 (6/10/24). No diabetic retinopathy seen on exam. Continue close monitoring of blood glucose, blood pressure, and cholesterol. Plan for annual dilated eye exam.    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 Harder to read small print. More difficulty seeing small words/numbers on TV. Having more trouble seeing road signs when driving at night. Glare from headlights when driving at night.   -Patient feels vision has been worsening since last visit in 2023.   -D/w patient option of YAG capsulotomy OS - patient would like to proceed. F/u next available YAG capsulotomy appt for OS - refract OU, glare/BAT OS, dilate OS only.     Dry eyes, inycplsmfK48.123  -Feels that vision gets blurry as day goes on or after reading for some time  -Use warm compresses 2 times a day  -Use artificial tears 2-4 times a day and PRN    IbfvoolvgZ25.00  YkcjcgakiiH04.4  -Continue current glasses for now.       No history of refractive surgery.   No FH of AMD/glaucoma

## 2024-09-06 DIAGNOSIS — I10 PRIMARY HYPERTENSION: ICD-10-CM

## 2024-09-06 RX ORDER — LISINOPRIL 10 MG/1
TABLET ORAL
Qty: 90 TABLET | Refills: 2 | Status: SHIPPED | OUTPATIENT
Start: 2024-09-06

## 2024-09-18 DIAGNOSIS — N32.81 OVERACTIVE BLADDER: ICD-10-CM

## 2024-09-18 RX ORDER — FESOTERODINE FUMARATE 8 MG/1
TABLET, FILM COATED, EXTENDED RELEASE ORAL
Qty: 30 TABLET | Refills: 0 | Status: SHIPPED | OUTPATIENT
Start: 2024-09-18

## 2024-09-20 ENCOUNTER — LAB (OUTPATIENT)
Dept: LAB | Facility: LAB | Age: 79
End: 2024-09-20
Payer: MEDICARE

## 2024-09-20 DIAGNOSIS — N39.41 URGE URINARY INCONTINENCE: ICD-10-CM

## 2024-09-20 DIAGNOSIS — E11.9 TYPE 2 DIABETES MELLITUS WITHOUT COMPLICATION, WITHOUT LONG-TERM CURRENT USE OF INSULIN (MULTI): ICD-10-CM

## 2024-09-20 DIAGNOSIS — I10 BENIGN ESSENTIAL HYPERTENSION: ICD-10-CM

## 2024-09-20 LAB
ANION GAP SERPL CALC-SCNC: 14 MMOL/L (ref 10–20)
BUN SERPL-MCNC: 19 MG/DL (ref 6–23)
CALCIUM SERPL-MCNC: 9.6 MG/DL (ref 8.6–10.6)
CHLORIDE SERPL-SCNC: 103 MMOL/L (ref 98–107)
CO2 SERPL-SCNC: 29 MMOL/L (ref 21–32)
CREAT SERPL-MCNC: 0.95 MG/DL (ref 0.5–1.05)
EGFRCR SERPLBLD CKD-EPI 2021: 62 ML/MIN/1.73M*2
EST. AVERAGE GLUCOSE BLD GHB EST-MCNC: 154 MG/DL
GLUCOSE SERPL-MCNC: 128 MG/DL (ref 74–99)
HBA1C MFR BLD: 7 %
POTASSIUM SERPL-SCNC: 3.6 MMOL/L (ref 3.5–5.3)
SODIUM SERPL-SCNC: 142 MMOL/L (ref 136–145)

## 2024-09-20 PROCEDURE — 80048 BASIC METABOLIC PNL TOTAL CA: CPT

## 2024-09-20 PROCEDURE — 83036 HEMOGLOBIN GLYCOSYLATED A1C: CPT

## 2024-09-20 PROCEDURE — 36415 COLL VENOUS BLD VENIPUNCTURE: CPT

## 2024-09-20 RX ORDER — MIRABEGRON 50 MG/1
50 TABLET, FILM COATED, EXTENDED RELEASE ORAL DAILY
Qty: 90 TABLET | Refills: 3 | Status: SHIPPED | OUTPATIENT
Start: 2024-09-20

## 2024-09-20 NOTE — TELEPHONE ENCOUNTER
Request received for   Requested Prescriptions     Pending Prescriptions Disp Refills    Myrbetriq 50 mg tablet extended release 24 hr 24 hr tablet [Pharmacy Med Name: MYRBETRIQ TAB 50MG] 90 tablet 3     Sig: TAKE 1 TABLET ONCE DAILY       Madalyn SAMEERA Chavez was last seen 6/20/2024 and has an appt for 12/19/2024.

## 2024-10-14 ENCOUNTER — OFFICE VISIT (OUTPATIENT)
Dept: URGENT CARE | Age: 79
End: 2024-10-14
Payer: MEDICARE

## 2024-10-14 VITALS
OXYGEN SATURATION: 95 % | BODY MASS INDEX: 28.25 KG/M2 | WEIGHT: 180 LBS | DIASTOLIC BLOOD PRESSURE: 79 MMHG | HEIGHT: 67 IN | HEART RATE: 105 BPM | RESPIRATION RATE: 18 BRPM | SYSTOLIC BLOOD PRESSURE: 131 MMHG

## 2024-10-14 DIAGNOSIS — M25.512 ACUTE PAIN OF LEFT SHOULDER: Primary | ICD-10-CM

## 2024-10-14 PROCEDURE — 99203 OFFICE O/P NEW LOW 30 MIN: CPT

## 2024-10-14 PROCEDURE — 1159F MED LIST DOCD IN RCRD: CPT

## 2024-10-14 PROCEDURE — 3078F DIAST BP <80 MM HG: CPT

## 2024-10-14 PROCEDURE — 1036F TOBACCO NON-USER: CPT

## 2024-10-14 PROCEDURE — 3075F SYST BP GE 130 - 139MM HG: CPT

## 2024-10-14 PROCEDURE — 1123F ACP DISCUSS/DSCN MKR DOCD: CPT

## 2024-10-14 ASSESSMENT — ENCOUNTER SYMPTOMS: ARTHRALGIAS: 1

## 2024-10-14 NOTE — PROGRESS NOTES
Subjective   Patient ID: Madalyn Chavez is a 79 y.o. female. They present today with a chief complaint of Injury (LEFT ARM AND SHOULDER PAIN ).    History of Present Illness  Patient is a 79-year-old female patient with extensive past medical history presents to urgent care today with complaint of left shoulder pain.  Patient states she fell 2 weeks ago and has had ongoing pain since that time.  She has been treating her symptoms  with over-the-counter medication which does seem to help but she is concerned that the pain is not getting better.  She denies any numbness, tingling, weakness or any other injuries.  No other complaints or concerns mention at this time.      History provided by:  Patient  Injury      Past Medical History  Allergies as of 10/14/2024 - Reviewed 10/14/2024   Allergen Reaction Noted    Penicillins Shortness of breath, Other, and Unknown 10/22/2014    Sulfa (sulfonamide antibiotics) Shortness of breath, Other, and Unknown 05/14/2023    Bee venom protein (honey bee) Unknown 05/14/2023    Ciprofloxacin Unknown 10/15/2023    Codeine Unknown 05/14/2023    Doxycycline Unknown 05/14/2023    Erythromycin Unknown 05/14/2023    Methylchloroisothiazolinone Unknown 02/11/2016    Nitrofurantoin monohyd/m-cryst Unknown, Myalgia, and Nausea/vomiting 05/14/2023    Oxycodone-acetaminophen Unknown 05/14/2023    Prednisone Unknown 05/14/2023       (Not in a hospital admission)         Past Medical History:   Diagnosis Date    Dysphagia, unspecified 12/05/2013    Dysphagia    Encounter for screening mammogram for malignant neoplasm of breast     Visit for screening mammogram    Localized edema 08/11/2016    Edema extremities    Other specified complication of genitourinary prosthetic devices, implants and grafts, initial encounter (CMS-HCC) 08/27/2018    Vaginal erosion secondary to pessary use, initial encounter    Personal history of diseases of the skin and subcutaneous tissue 09/05/2013    History of  "contact dermatitis    Personal history of other diseases of the circulatory system 10/31/2014    History of hypertension    Personal history of other diseases of the nervous system and sense organs     History of cataract    Personal history of other diseases of urinary system     History of bladder problems    Personal history of other mental and behavioral disorders 08/09/2013    History of anxiety disorder       Past Surgical History:   Procedure Laterality Date    CATARACT EXTRACTION  10/31/2014    Cataract Surgery    COLONOSCOPY  03/06/2013    Complete Colonoscopy    DILATION AND CURETTAGE OF UTERUS  03/06/2013    Dilation And Curettage    DILATION AND CURETTAGE OF UTERUS  09/16/2016    Dilation And Curettage    HERNIA REPAIR  03/06/2013    Hernia Repair    HERNIA REPAIR  09/16/2016    Hernia Repair    OTHER SURGICAL HISTORY  08/30/2022    Cataract surgery    TONSILLECTOMY  10/31/2014    Tonsillectomy        reports that she has never smoked. She has never used smokeless tobacco. She reports that she does not currently use alcohol. She reports that she does not use drugs.    Review of Systems  Review of Systems   Musculoskeletal:  Positive for arthralgias.                                  Objective    Vitals:    10/14/24 1451   BP: 131/79   Pulse: 105   Resp: 18   SpO2: 95%   Weight: 81.6 kg (180 lb)   Height: 1.702 m (5' 7\")     No LMP recorded. Patient is postmenopausal.    Physical Exam  Vitals and nursing note reviewed.   Constitutional:       General: She is not in acute distress.     Appearance: Normal appearance. She is not ill-appearing, toxic-appearing or diaphoretic.   HENT:      Head: Normocephalic and atraumatic.      Mouth/Throat:      Mouth: Mucous membranes are moist.   Eyes:      Extraocular Movements: Extraocular movements intact.      Conjunctiva/sclera: Conjunctivae normal.      Pupils: Pupils are equal, round, and reactive to light.   Cardiovascular:      Rate and Rhythm: Normal rate and " regular rhythm.      Pulses: Normal pulses.      Heart sounds: Normal heart sounds.   Pulmonary:      Effort: Pulmonary effort is normal. No respiratory distress.      Breath sounds: Normal breath sounds. No stridor. No wheezing, rhonchi or rales.   Chest:      Chest wall: No tenderness.   Musculoskeletal:         General: Tenderness and signs of injury present. No swelling or deformity.      Right shoulder: Normal.      Left shoulder: Tenderness present. No swelling, deformity, effusion, laceration, bony tenderness or crepitus. Decreased range of motion. Normal strength. Normal pulse.        Arms:       Cervical back: Normal range of motion and neck supple.      Comments: Pain with palpation of the anterior shoulder.  No obvious deformity or dislocation.  Pain with active and passive abduction.  No ecchymosis or appreciable edema.  Normal clavicular exam.  Normal scapular exam.  Normal upper arm, elbow and lower arm exam.  Radial pulse strong and regular.  Normal skin color and temperature.   Skin:     General: Skin is warm and dry.      Capillary Refill: Capillary refill takes less than 2 seconds.   Neurological:      General: No focal deficit present.      Mental Status: She is alert and oriented to person, place, and time.   Psychiatric:         Mood and Affect: Mood normal.         Behavior: Behavior normal.         Procedures      Assessment/Plan   Allergies, medications, history, and pertinent labs/EKGs/Imaging reviewed by REAGAN Lilly.     Medical Decision Making  Patient is well appearing, afebrile, non toxic, not hypoxic, and appropriate for outpatient treatment and management at time of evaluation. Patient presents with left shoulder pain as described above. Differential includes but not limited to: Dislocation, fracture, rotator cuff tear, frozen shoulder, other.  Low suspicion for fracture given no bruising or ecchymosis.  Suspect tendon or ligament injury.  I did offer x-ray but patient  declined stating she does not feel there is a fracture.  I also offered a sling for comfort but patient again declined stating she is fine just putting her hands in her pocket.  History and exam consistent with rotator cuff tear/frozen shoulder.  Recommend continued use of over-the-counter pain medication and ice as needed for symptom relief.  A referral to orthopedics was placed on her behalf.  Patient is agreement this plan.  She was discharged in stable condition.  All questions and concerns addressed.      Plan: Discussed differential with the patient. Patient voices understanding and is agreeable to close follow-up with their PCP in the next 2-3 days. They understand they should go to the emergency room immediately for any new, worsening or concerning symptoms. Patient understands return precautions and discharge instructions.      Orders and Diagnoses  Diagnoses and all orders for this visit:  Acute pain of left shoulder  -     Referral to Orthopaedic Surgery; Future      Medical Admin Record      Follow Up Instructions  No follow-ups on file.    Patient disposition: Home    Electronically signed by REAGAN Lilly  3:42 PM

## 2024-10-14 NOTE — PATIENT INSTRUCTIONS
You were seen at Urgent Care today for left shoulder pain. Please treat as discussed.  Monitor for red flags which we spoke about, If your symptoms change, worsen or become concerning in any way, please go to the emergency room immediately, otherwise you can followup with orthopedics as planned.  A referral has been placed on your behalf.

## 2024-10-22 DIAGNOSIS — K21.00 GASTROESOPHAGEAL REFLUX DISEASE WITH ESOPHAGITIS WITHOUT HEMORRHAGE: ICD-10-CM

## 2024-10-22 RX ORDER — OMEPRAZOLE 40 MG/1
CAPSULE, DELAYED RELEASE ORAL
Qty: 30 CAPSULE | Refills: 0 | Status: SHIPPED | OUTPATIENT
Start: 2024-10-22

## 2024-10-25 ENCOUNTER — HOSPITAL ENCOUNTER (OUTPATIENT)
Dept: RADIOLOGY | Facility: CLINIC | Age: 79
Discharge: HOME | End: 2024-10-25
Payer: MEDICARE

## 2024-10-25 ENCOUNTER — OFFICE VISIT (OUTPATIENT)
Dept: ORTHOPEDIC SURGERY | Facility: CLINIC | Age: 79
End: 2024-10-25
Payer: MEDICARE

## 2024-10-25 VITALS — HEIGHT: 67 IN | BODY MASS INDEX: 29.03 KG/M2 | WEIGHT: 185 LBS

## 2024-10-25 DIAGNOSIS — M75.22 BICEPS TENDINITIS OF LEFT SHOULDER: ICD-10-CM

## 2024-10-25 DIAGNOSIS — M25.512 LEFT SHOULDER PAIN, UNSPECIFIED CHRONICITY: ICD-10-CM

## 2024-10-25 DIAGNOSIS — S46.012A TRAUMATIC TEAR OF LEFT ROTATOR CUFF, UNSPECIFIED TEAR EXTENT, INITIAL ENCOUNTER: Primary | ICD-10-CM

## 2024-10-25 DIAGNOSIS — M25.512 ACUTE PAIN OF LEFT SHOULDER: ICD-10-CM

## 2024-10-25 PROCEDURE — 1036F TOBACCO NON-USER: CPT

## 2024-10-25 PROCEDURE — 20610 DRAIN/INJ JOINT/BURSA W/O US: CPT

## 2024-10-25 PROCEDURE — 73030 X-RAY EXAM OF SHOULDER: CPT | Mod: LT

## 2024-10-25 PROCEDURE — 1159F MED LIST DOCD IN RCRD: CPT

## 2024-10-25 PROCEDURE — 1160F RVW MEDS BY RX/DR IN RCRD: CPT

## 2024-10-25 PROCEDURE — 1123F ACP DISCUSS/DSCN MKR DOCD: CPT

## 2024-10-25 PROCEDURE — 99204 OFFICE O/P NEW MOD 45 MIN: CPT

## 2024-10-25 RX ORDER — TRIAMCINOLONE ACETONIDE 40 MG/ML
40 INJECTION, SUSPENSION INTRA-ARTICULAR; INTRAMUSCULAR
Status: COMPLETED | OUTPATIENT
Start: 2024-10-25 | End: 2024-10-25

## 2024-10-25 RX ORDER — MELOXICAM 15 MG/1
15 TABLET ORAL DAILY
Qty: 30 TABLET | Refills: 11 | Status: SHIPPED | OUTPATIENT
Start: 2024-10-25 | End: 2025-10-25

## 2024-10-25 RX ORDER — LIDOCAINE HYDROCHLORIDE 5 MG/ML
4 INJECTION, SOLUTION INFILTRATION; PERINEURAL
Status: COMPLETED | OUTPATIENT
Start: 2024-10-25 | End: 2024-10-25

## 2024-10-25 ASSESSMENT — PAIN - FUNCTIONAL ASSESSMENT: PAIN_FUNCTIONAL_ASSESSMENT: NO/DENIES PAIN

## 2024-10-25 NOTE — PROGRESS NOTES
History of Present Illness:    79 y.o. female presents to clinic today for her left shoulder.  Patient states that she had an acute fall approximately a month ago.  She states that she went to urgent care there were no acute fractures or dislocations seen.  She states that she was having quite a bit of pain initially but it has improved some.  She continues to have anterior lateral shoulder pain.  She notes that her active range of motion has improved since the fall.  She did report that she had some mild shoulder pain prior to the fall but it seems to gotten a lot worse since.  She has been taking Tylenol as needed for pain.  She denies any radicular symptoms.  She does have a little bit of neck pain at times.  She notes her shoulder is worse with activity and is better with rest.  He she does have a little bit of night pain but again seems like it is getting better.  She has not had any surgeries on the left shoulder previously.        Review of Systems:    GENERAL: Negative  GI: Negative  MUSCULOSKELETAL: See HPI  SKIN: Negative  NEURO:  Negative     Physical Exam:    Shoulder:  Examination of the left shoulder demonstrates the skin is intact.  There is no bruising or deformities noted.  There is no tenderness palpation of the sternoclavicular joint.  There is mild tenderness to palpation over the AC joint today.  Patient can actively abduct and forward flex her arm to approximately 100 degrees today.  With passive range of motion she can forward flex shin and abduction 125 degrees, externally rotates to 45 and internally rotates to sacrum patient has tenderness palpation over the biceps today.  Positive Neer's and Stuart testing.  Positive Jobes testing with pain and weakness on exam today.  Elbow and wrist motion were not irritable.  Radial pulse 2+ and palpable. SILT. UE is NVI.     Imaging:     X-rays of the patient were ordered by Dr Garcia and obtained today.  Dr Garcia personally reviewed the results of  the x-rays.    In addition, Dr Garcia independently interpreted the patient's x-rays (performed by the Radiology department) by viewing the x-ray images and this is Dr. Garcia's personal interpretation:       X-rays of the left shoulder demonstrate no significant AC joint arthritis.   there does appear to be proximal migration of the humeral head over the glenoid cavity.  The glenohumeral joint space is well-maintained.  There is no acute fractures or dislocations seen on x-ray today.      Assessment:   Left shoulder pain related to acute on chronic rotator cuff tear.    Plan:  Discussed further management with patient today.  At this time we discussed with patient that she likely has a chronic large rotator cuff tear that was acutely exacerbated with her fall.  We discussed both conservative treatment options like physical therapy, anti-inflammatories, cortisone injections versus an MRI to evaluate the rotator cuff.  We discussed with the patient that if we ordered an MRI be evaluating the rotator cuff to talk about potential surgical intervention.  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  We discussed moving forward with conservative treatment options.  We gave the patient a prescription for physical therapy to work on increasing the range of motion and strength in the shoulder.   We also called in a prescription for anti-inflammatories for the patient.  The patient was informed that there are rare risks of using nonsteroidal antiinflammatory (NSAID) medications.  Risks of NSAIDS include, but are not limited to, upset stomach, ulcers in the stomach and other places in the gastrointestinal tract, and a mild increase in cardiovascular risk as a result of the antiinflammatory medications.  In addition, there is an increased risk in bleeding as a result of the medications.   The patient was advised to stop taking the NSAIDs if they cause them to have an upset stomach.  The patient was instructed to take the medication on a p.r.n. basis as needed only.  NSAIDs are not supposed to be taken every day for more than a few weeks.  If they have any questions or problems with the antiinflammatory medications, they should stop taking the medication immediately and call the office.    Additionally we did discuss trying a subacromial cortisone injection into the left shoulder.  I explained to the patient that there are some rare risks of steroid injections.       Rare risks of steroid injections into the shoulder include pain at the site of the injection, local swelling, irritation from the injection or the skin spray, local discoloration of the skin, risk of bleeding, and a risk of shoulder infection.  If the patient does have a flareup of pain in the evening following the injection, they should ice the shoulder 15 minutes at a time 3 times a day for up to 3 days.  If the pain gets too severe, they should go to a local Emergency Room right away.       The patient decided to proceed with the injection.  After sterilely prepping the posterior aspect of the shoulder joint with Betadyne, 5 mL of 0.25% Marcaine and 1 mL of Kenalog 40 mg were injected into the subacromial bursa from a posterior approach.  There were no complications.  A bandage was applied over the site of the injection. The patient tolerated the procedure well.     We will see the patient back in 6-8 weeks to reevaluate their shoulder pain. If they are still having pain at that time, we would then need to order a MRI to look for possible rotator cuff tears or biceps tearing in the shoulder.  If the patient gets better with conservative management then we will follow up as needed.        This patient has a new, acute, previously-undiagnosed problem of their affected extremity.  We will begin treatment as listed here and monitor  treatment based upon their progression and response to treatment.  Due to the fact that we are just beginning treatment on this issue, this is currently considered an undiagnosed new problem with uncertain prognosis.  The exact diagnosis and their prognosis will depend upon their response to treatment and progression of their condition as time progresses.     Due to this patient's condition, they are at a moderate risk of morbidity from additional diagnostic testing / treatment.       Patient ID: Madalyn Chavez is a 79 y.o. female.    L Inj/Asp: L subacromial bursa on 10/25/2024 1:01 PM  Indications: pain  Details: 22 G needle, posterior approach  Medications: 40 mg triamcinolone acetonide 40 mg/mL; 4 mL lidocaine 5 mg/mL (0.5 %)  Outcome: tolerated well, no immediate complications  Procedure, treatment alternatives, risks and benefits explained, specific risks discussed. Consent was given by the patient. Immediately prior to procedure a time out was called to verify the correct patient, procedure, equipment, support staff and site/side marked as required. Patient was prepped and draped in the usual sterile fashion.

## 2024-10-28 ENCOUNTER — OFFICE VISIT (OUTPATIENT)
Dept: SURGERY | Facility: CLINIC | Age: 79
End: 2024-10-28
Payer: MEDICARE

## 2024-10-28 VITALS
HEART RATE: 85 BPM | BODY MASS INDEX: 31.45 KG/M2 | HEIGHT: 67 IN | SYSTOLIC BLOOD PRESSURE: 154 MMHG | WEIGHT: 200.4 LBS | DIASTOLIC BLOOD PRESSURE: 82 MMHG

## 2024-10-28 DIAGNOSIS — K80.20: Primary | ICD-10-CM

## 2024-10-28 PROCEDURE — 1123F ACP DISCUSS/DSCN MKR DOCD: CPT | Performed by: SURGERY

## 2024-10-28 PROCEDURE — 1126F AMNT PAIN NOTED NONE PRSNT: CPT | Performed by: SURGERY

## 2024-10-28 PROCEDURE — 99213 OFFICE O/P EST LOW 20 MIN: CPT | Performed by: SURGERY

## 2024-10-28 PROCEDURE — 3077F SYST BP >= 140 MM HG: CPT | Performed by: SURGERY

## 2024-10-28 PROCEDURE — 1036F TOBACCO NON-USER: CPT | Performed by: SURGERY

## 2024-10-28 PROCEDURE — 1159F MED LIST DOCD IN RCRD: CPT | Performed by: SURGERY

## 2024-10-28 PROCEDURE — 3079F DIAST BP 80-89 MM HG: CPT | Performed by: SURGERY

## 2024-10-28 ASSESSMENT — PAIN SCALES - GENERAL: PAINLEVEL_OUTOF10: 0-NO PAIN

## 2024-11-04 NOTE — PROGRESS NOTES
Subjective     Madalyn Chavez is a 79 y.o. female who presents for the following: Skin Check.  She notes a raised, brown, rough bump on the left side of her neck, which has been present for several months and has been itching recently.  It has not changed in any other way, including in size, shape, or color, and it does not hurt or bleed.  She also notes red, scaly, itchy areas on her hands.  She denies any other new, changing, or concerning skin lesions since her last visit; no bleeding, itching, or burning lesions.      Review of Systems:  No other skin or systemic complaints other than what is documented elsewhere in the note.    The following portions of the chart were reviewed this encounter and updated as appropriate:       Skin Cancer History  No skin cancer on file.    Specialty Problems          Dermatology Problems    Actinic keratosis    Dermatofibroma of trunk    Hemangioma of skin and subcutaneous tissue    Inflamed seborrheic keratosis    Neoplasm of uncertain behavior of skin    Other benign neoplasm of skin, unspecified    Other melanin hyperpigmentation    Personal history of other malignant neoplasm of skin    Scar condition and fibrosis of skin    Seborrheic dermatitis, unspecified    Skin changes due to chronic exposure to nonionizing radiation, unspecified       Past Dermatologic / Past Relevant Medical History:    - history of BCC on right distal shoulder diagnosed on 7/14/21 s/p wide local excision with 4-mm margins by Dr. 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 Murillo on 4/28/09 and both s/p wide local excision by Dr. 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, and her  and daughter are patients in our office as well    Allergies:  Penicillins, Sulfa  (sulfonamide antibiotics), Bee venom protein (honey bee), Ciprofloxacin, Codeine, Doxycycline, Erythromycin, Methylchloroisothiazolinone, Nitrofurantoin monohyd/m-cryst, Oxycodone-acetaminophen, and Prednisone    Current Medications / CAM's:    Current Outpatient Medications:     albuterol (ProAir HFA) 90 mcg/actuation inhaler, Inhale., Disp: , Rfl:     aspirin 81 mg EC tablet, Take 1 tablet (81 mg) by mouth once daily., Disp: , Rfl:     blood sugar diagnostic (OneTouch Verio test strips) strip, , Disp: , Rfl:     blood sugar diagnostic (OneTouch Verio test strips) strip, 1 each once daily., Disp: 100 each, Rfl: 3    cholecalciferol (Vitamin D-3) 50 MCG (2000 UT) tablet, Take by mouth., Disp: , Rfl:     diclofenac sodium (Voltaren) 1 % gel gel, Apply 4.5 inches (4 g) topically 2 times a day as needed., Disp: , Rfl:     fenofibrate (Tricor) 48 mg tablet, TAKE 1 TABLET DAILY, Disp: 90 tablet, Rfl: 3    fesoterodine (Toviaz) 8 mg tablet extended release 24 hr, Take 1 tablet (8 mg) by mouth once daily., Disp: 30 tablet, Rfl: 2    fesoterodine 8 mg tablet extended release 24 hr, TAKE 1 TABLET BY MOUTH EARLY IN THE MORNING, Disp: 30 tablet, Rfl: 0    FreeStyle glucose monitoring kit, 1 each if needed., Disp: , Rfl:     FreeStyle Gypsy 2 Sensor kit, Use as instructed, Disp: 1 each, Rfl: 0    hydroCHLOROthiazide (Microzide) 12.5 mg capsule, Take by mouth., Disp: , Rfl:     hydroCHLOROthiazide (Microzide) 12.5 mg tablet, TAKE 1 TABLET DAILY, Disp: 90 tablet, Rfl: 1    lancets (OneTouch UltraSoft Lancets) misc, , Disp: , Rfl:     lisinopril 10 mg tablet, TAKE 1 TABLET DAILY, Disp: 90 tablet, Rfl: 2    meloxicam (Mobic) 15 mg tablet, Take 1 tablet (15 mg) by mouth once daily., Disp: 30 tablet, Rfl: 11    metFORMIN (Glucophage) 500 mg tablet, Take 2 tablets (1,000 mg) by mouth 2 times daily (morning and late afternoon)., Disp: 180 tablet, Rfl: 1    Myrbetriq 50 mg tablet extended release 24 hr 24 hr tablet, TAKE 1 TABLET ONCE  DAILY, Disp: 90 tablet, Rfl: 3    omeprazole (PriLOSEC) 40 mg DR capsule, TAKE 1 CAPSULE DAILY BEFOREA MEAL. PLEASE MAKE AN     APPOINTMENT WITH YOUR      DOCTOR, Disp: 30 capsule, Rfl: 0    OneTouch Ultra Test strip, 102 strips 2 times a day., Disp: 102 strip, Rfl: 3    PARoxetine (Paxil) 40 mg tablet, Take 1 tablet (40 mg) by mouth once daily., Disp: 90 tablet, Rfl: 2    repaglinide (Prandin) 1 mg tablet, Take 1 tablet (1 mg) by mouth 3 times a day before meals., Disp: 270 tablet, Rfl: 1    simvastatin (Zocor) 20 mg tablet, TAKE 1 TABLET AT BEDTIME, Disp: 90 tablet, Rfl: 1    diazePAM (Valium) 5 mg tablet, Take 1 tablet (5 mg) by mouth 1 time for 1 dose., Disp: 1 tablet, Rfl: 0    triamcinolone (Kenalog) 0.025 % cream, Apply twice daily to affected areas of hands (avoid face, groin, body folds) for 2 weeks, then taper to twice daily on weekends only; repeat every 6-8 weeks as needed for flares, Disp: 80 g, Rfl: 1     Objective   Well appearing patient in no apparent distress; mood and affect are within normal limits.    A full examination was performed including scalp, face, eyes, ears, nose, lips, neck, chest, axillae, abdomen, back, bilateral upper extremities, and bilateral lower extremities. All findings within normal limits unless otherwise noted below.    Assessment/Plan   1. Inflamed seborrheic keratosis  Neck - Anterior  On the patient's left lateral neck, there is a 9 mm erythematous and light brown-colored, hyperkeratotic, stuck-on appearing papule with a surrounding rim of erythema    Inflamed Seborrheic Keratosis -left lateral neck.  The benign nature of this lesion was discussed with the patient today and reassurance provided.  Given the history the patient provides of frequent irritation and associated symptoms as well as its inflamed appearance on exam today, I offered to treat this lesion with liquid nitrogen cryotherapy.  The patient expressed understanding, is in agreement with this plan, and  wishes to proceed with cryotherapy today.    Destr of lesion - Neck - Anterior  Complexity: simple    Destruction method: cryotherapy    Informed consent: discussed and consent obtained    Lesion destroyed using liquid nitrogen: Yes    Cryotherapy cycles:  2  Outcome: patient tolerated procedure well with no complications    Post-procedure details: wound care instructions given      2. Actinic keratosis (9)  Head - Anterior (Face) (9)  Scattered on the patient's nose and left cheek, there are 9 erythematous, gritty, scaly macules     Actinic Keratoses -scattered on nose and left cheek.  The pre-cancerous nature of these lesions and treatment options were discussed with the patient today.  At this time, I recommend treatment with liquid nitrogen cryotherapy.  The patient expressed understanding, is in agreement with this plan, and wishes to proceed with cryotherapy today.    Destr of lesion - Head - Anterior (Face) (9)  Complexity: simple    Destruction method: cryotherapy    Informed consent: discussed and consent obtained    Lesion destroyed using liquid nitrogen: Yes    Cryotherapy cycles:  1  Outcome: patient tolerated procedure well with no complications    Post-procedure details: wound care instructions given      3. Melanocytic nevus of trunk  Scattered on the patient's face, neck, trunk, and extremities, there are several small, round- to oval-shaped, brown-pigmented and pink-colored, symmetric, uniform-appearing macules and dome-shaped papules    Clinically benign- to slightly atypical-appearing nevi - the clinically benign- to slightly atypical-appearing nature of the patient's nevi was discussed with the patient today.  None of the patient's nevi meet threshold for biopsy today.  I emphasized the importance of performing monthly self-skin exams using the ABCDs of monitoring moles, which were reviewed with the patient today and an informational hand-out provided.  I also emphasized the importance of sun  avoidance and sun protection with daily sunscreen use.  The patient expressed understanding and is in agreement with this plan.    4. Seborrheic keratosis  Scattered on the patient's face, neck, trunk, and extremities, there are multiple tan- to light brown-colored, hyperkeratotic, stuck-on appearing papules of varying size and shape    Seborrheic Keratoses - the benign nature of these lesions was discussed with the patient today and reassurance provided.  No treatment is medically indicated for the noninflamed SKs at this time.    5. Hemangioma of skin  Scattered on the patient's face, neck, trunk, and extremities, there are multiple small, round, cherry red- to purplish-colored, symmetric, uniform, vascular-appearing macules and papules    Cherry Angiomas - the benign nature of these vascular lesions was discussed with the patient today and reassurance provided.  No treatment is medically indicated for these lesions at this time.    6. Hand eczema  Left Hand - Anterior  On the patient's bilateral hands, there are multiple erythematous, scaly papules coalescing into several ill-defined, slightly lichenified, thin plaques    Hand Dermatitis -bilateral hands.  The potentially chronic and intermittently flaring nature of this condition and treatment options were discussed extensively with the patient today.  At this time, I recommend topical steroid therapy with Triamcinolone 0.025% cream, which the patient was instructed to apply twice daily to the affected areas of her hands (avoid face, groin, body folds) for the next 2 weeks, followed by taper to twice daily on weekends only for persistent areas; the patient may repeat treatment in a 2-week burst-and-taper fashion every 6-8 weeks as needed for future flares.  I also emphasized the importance of dry, sensitive skin care and good hand hygiene, including minimizing the frequency and duration of hand-washing as much as is safely possible; using a lukewarm, but not hot  water and a mild soap, such as Dove; and frequent and aggressive moisturization, at least twice daily and immediately following hand-washing, with recommended over-the-counter moisturizing creams, such as Eucerin, Cetaphil, Cerave, or Aveeno; Vaseline or Aquaphor ointments; or Neutrogena Norwegian Formula Hand Cream.  The risks, benefits, and side effects of this medication, including possible skin atrophy with overuse of topical steroids, were discussed.  The patient expressed understanding and is in agreement with this plan.    triamcinolone (Kenalog) 0.025 % cream - Left Hand - Anterior  Apply twice daily to affected areas of hands (avoid face, groin, body folds) for 2 weeks, then taper to twice daily on weekends only; repeat every 6-8 weeks as needed for flares    7. History of nonmelanoma skin cancer  On the patient's right distal shoulder, left anterior neck, and left anterior thigh, there are well-healed scars with no evidence of recurrent growth on exam today.    History of nonmelanoma skin cancers and actinic keratoses and photodamage.  There is no evidence of recurrence on exam today.  The signs and symptoms of skin cancer were reviewed and the patient was advised to practice sun protection and sun avoidance, use daily sunscreen, and perform regular self skin exams.  I will have the patient return to our office in 6 to 12 months for routine follow-up and skin exam, and the patient was instructed to call our office should the patient notice any new, changing, symptomatic, or otherwise concerning skin lesions before then.  The patient expressed understanding and is in agreement with this plan.    8. Diffuse photodamage of skin  Photodistributed  Diffuse photodamage with actinic changes with telangiectasia and mottled pigmentation in sun-exposed areas.    Photodamage.  The signs and symptoms of skin cancer were reviewed and the patient was advised to practice sun protection and sun avoidance, use daily  sunscreen, and perform regular self skin exams.  Sun protection was discussed, including avoiding the mid-day sun, wearing a sunscreen with SPF at least 50, and stressing the need for reapplication of sunscreen and applying more than they think they need.

## 2024-11-05 ENCOUNTER — APPOINTMENT (OUTPATIENT)
Dept: DERMATOLOGY | Facility: CLINIC | Age: 79
End: 2024-11-05
Payer: MEDICARE

## 2024-11-05 DIAGNOSIS — D18.01 HEMANGIOMA OF SKIN: ICD-10-CM

## 2024-11-05 DIAGNOSIS — L57.0 ACTINIC KERATOSIS: ICD-10-CM

## 2024-11-05 DIAGNOSIS — L57.8 DIFFUSE PHOTODAMAGE OF SKIN: ICD-10-CM

## 2024-11-05 DIAGNOSIS — L82.0 INFLAMED SEBORRHEIC KERATOSIS: Primary | ICD-10-CM

## 2024-11-05 DIAGNOSIS — D22.5 MELANOCYTIC NEVUS OF TRUNK: ICD-10-CM

## 2024-11-05 DIAGNOSIS — Z85.828 HISTORY OF NONMELANOMA SKIN CANCER: ICD-10-CM

## 2024-11-05 DIAGNOSIS — L30.9 HAND ECZEMA: ICD-10-CM

## 2024-11-05 DIAGNOSIS — L82.1 SEBORRHEIC KERATOSIS: ICD-10-CM

## 2024-11-05 PROCEDURE — 17000 DESTRUCT PREMALG LESION: CPT | Performed by: DERMATOLOGY

## 2024-11-05 PROCEDURE — 17003 DESTRUCT PREMALG LES 2-14: CPT | Performed by: DERMATOLOGY

## 2024-11-05 PROCEDURE — 99214 OFFICE O/P EST MOD 30 MIN: CPT | Performed by: DERMATOLOGY

## 2024-11-05 PROCEDURE — 1123F ACP DISCUSS/DSCN MKR DOCD: CPT | Performed by: DERMATOLOGY

## 2024-11-05 PROCEDURE — 1159F MED LIST DOCD IN RCRD: CPT | Performed by: DERMATOLOGY

## 2024-11-05 PROCEDURE — 17110 DESTRUCTION B9 LES UP TO 14: CPT | Performed by: DERMATOLOGY

## 2024-11-05 RX ORDER — TRIAMCINOLONE ACETONIDE 0.25 MG/G
CREAM TOPICAL
Qty: 80 G | Refills: 1 | Status: SHIPPED | OUTPATIENT
Start: 2024-11-05

## 2024-11-05 ASSESSMENT — DERMATOLOGY PATIENT ASSESSMENT
DO YOU USE A TANNING BED: NO
ARE YOU AN ORGAN TRANSPLANT RECIPIENT: NO
DO YOU HAVE IRREGULAR MENSTRUAL CYCLES: NO
ARE YOU ON BIRTH CONTROL: NO
DO YOU HAVE ANY NEW OR CHANGING LESIONS: YES
ARE YOU TRYING TO GET PREGNANT: NO
DO YOU USE SUNSCREEN: OCCASIONALLY

## 2024-11-05 ASSESSMENT — ITCH NUMERIC RATING SCALE: HOW SEVERE IS YOUR ITCHING?: 0

## 2024-11-05 ASSESSMENT — DERMATOLOGY QUALITY OF LIFE (QOL) ASSESSMENT: ARE THERE EXCLUSIONS OR EXCEPTIONS FOR THE QUALITY OF LIFE ASSESSMENT: NO

## 2024-11-10 NOTE — PROGRESS NOTES
Endocrinology  11/11/2024    History of Present Illness   Madalyn Chavez is a 79 y.o. year old female with medical history of T2D, HTN, DLD, here for diabetes.     LV: 6/2023, Dr. Michele      Current regimen:   - Metformin 1000 mg twice daily   - Replaglinide 1 mg TID AC     SMBG: None     Hypoglycemia: Occasionally -  in the 60s-70s   Hypoglycemia awareness: Yes    Weight changes: Unsure - weight increased 15 lbs in 3 days in EMR, but clothes fit the same. She has developed significant LE edema, and is seeing PCP later this month.      Denies any increased thirst, urination, or blurry vision.     Diet:   - Eats 2-3 meals a day. Sometimes skips breakfast if she gets up late   - 1 can of pepsi a day     Under a lot of stress lately -  dealing with heart problems.     DM history:   Year/age at diagnosis: Unsure - ~20 years ago    Prior medications: Denies   Previous hospitalizations for hyperglycemia: Denies   Complications: Denies     Last eye exam: 8/2024 - no retinopathy   Last foot exam: Podiatry every 3-4 months      No history of pancreatitis   No family history of MTC   She reports several UTIs this year - follows with urology.     Denies any shortness of breath. Able to lay flat without shortness of breath. Denies chest pain.     Medications     Current Outpatient Medications   Medication Instructions    albuterol (ProAir HFA) 90 mcg/actuation inhaler Inhale.    aspirin 81 mg EC tablet 1 tablet, Daily    blood sugar diagnostic (OneTouch Verio test strips) strip No dose, route, or frequency recorded.    blood sugar diagnostic (OneTouch Verio test strips) strip 1 each, miscellaneous, Daily    cholecalciferol (Vitamin D-3) 50 MCG (2000 UT) tablet Take by mouth.    diazePAM (VALIUM) 5 mg, oral, Once    diclofenac sodium (VOLTAREN) 4 g, 2 times daily PRN    fenofibrate (TRICOR) 48 mg, oral, Daily    fesoterodine (TOVIAZ) 8 mg, oral, Daily    fesoterodine 8 mg tablet extended release 24 hr TAKE 1 TABLET  BY MOUTH EARLY IN THE MORNING    FreeStyle glucose monitoring kit 1 each, As needed    FreeStyle Gypsy 2 Sensor kit Use as instructed    hydroCHLOROthiazide (Microzide) 12.5 mg capsule Take by mouth.    hydroCHLOROthiazide (Microzide) 12.5 mg tablet TAKE 1 TABLET DAILY    lancets (OneTouch UltraSoft Lancets) misc No dose, route, or frequency recorded.    lisinopril 10 mg tablet TAKE 1 TABLET DAILY    meloxicam (MOBIC) 15 mg, oral, Daily    metFORMIN (GLUCOPHAGE) 1,000 mg, oral, 2 times daily (morning and late afternoon)    Myrbetriq 50 mg, oral, Daily    omeprazole (PriLOSEC) 40 mg DR capsule TAKE 1 CAPSULE DAILY BEFOREA MEAL. PLEASE MAKE AN     APPOINTMENT WITH YOUR      DOCTOR    OneTouch Ultra Test strip 102 strips, miscellaneous, 2 times daily    PARoxetine (PAXIL) 40 mg, oral, Daily    repaglinide (PRANDIN) 1 mg, oral, 3 times daily before meals    simvastatin (Zocor) 20 mg tablet TAKE 1 TABLET AT BEDTIME    triamcinolone (Kenalog) 0.025 % cream Apply twice daily to affected areas of hands (avoid face, groin, body folds) for 2 weeks, then taper to twice daily on weekends only; repeat every 6-8 weeks as needed for flares        History     Past Medical History:   Diagnosis Date    Dysphagia, unspecified 12/05/2013    Dysphagia    Encounter for screening mammogram for malignant neoplasm of breast     Visit for screening mammogram    Localized edema 08/11/2016    Edema extremities    Other specified complication of genitourinary prosthetic devices, implants and grafts, initial encounter (CMS-[facility]) 08/27/2018    Vaginal erosion secondary to pessary use, initial encounter    Personal history of diseases of the skin and subcutaneous tissue 09/05/2013    History of contact dermatitis    Personal history of other diseases of the circulatory system 10/31/2014    History of hypertension    Personal history of other diseases of the nervous system and sense organs     History of cataract    Personal history of other  "diseases of urinary system     History of bladder problems    Personal history of other mental and behavioral disorders 08/09/2013    History of anxiety disorder       Past Surgical History:   Procedure Laterality Date    CATARACT EXTRACTION  10/31/2014    Cataract Surgery    COLONOSCOPY  03/06/2013    Complete Colonoscopy    DILATION AND CURETTAGE OF UTERUS  03/06/2013    Dilation And Curettage    DILATION AND CURETTAGE OF UTERUS  09/16/2016    Dilation And Curettage    HERNIA REPAIR  03/06/2013    Hernia Repair    HERNIA REPAIR  09/16/2016    Hernia Repair    OTHER SURGICAL HISTORY  08/30/2022    Cataract surgery    TONSILLECTOMY  10/31/2014    Tonsillectomy       Family History   Problem Relation Name Age of Onset    Hypertension Mother      Other (stroke syndrome) Mother      Other (cardiovascular disorder) Father      Coronary artery disease Brother          Allergies   Allergen Reactions    Penicillins Shortness of breath, Other and Unknown     Flushing, respiratory distress    Sulfa (Sulfonamide Antibiotics) Shortness of breath, Other and Unknown     Flushing, respiratory     Bee Venom Protein (Honey Bee) Unknown    Ciprofloxacin Unknown    Codeine Unknown    Doxycycline Unknown    Erythromycin Unknown    Methylchloroisothiazolinone Unknown    Nitrofurantoin Monohyd/M-Cryst Unknown, Myalgia and Nausea/vomiting    Oxycodone-Acetaminophen Unknown    Prednisone Unknown        Physical Exam   /86 (BP Location: Left arm, Patient Position: Sitting, BP Cuff Size: Adult)   Pulse 94   Ht 1.702 m (5' 7\")   Wt 90.3 kg (199 lb)   BMI 31.17 kg/m²   General: Well appearing, no acute distress  Heart: Normal rate  Neck: No visible goiter   Lungs: Breathing comfortably on room air   Abdomen: Soft, nontender  Extremities: Warm, 3+ pitting edema bilaterally in LE   Skin: No rashes    Labs and Imaging     Lab Results   Component Value Date    HGBA1C 7.0 (H) 09/20/2024    HGBA1C 6.8 (A) 06/10/2024    HGBA1C 6.9 (A) " 04/08/2024     09/20/2024    K 3.6 09/20/2024     09/20/2024    CO2 29 09/20/2024    BUN 19 09/20/2024    CREATININE 0.95 09/20/2024    CALCIUM 9.6 09/20/2024    ALBUMIN 4.1 06/06/2024    PROT 6.9 06/06/2024    BILITOT 0.4 06/06/2024    ALKPHOS 65 06/06/2024    ALT 13 06/06/2024    AST 26 06/06/2024    GLUCOSE 128 (H) 09/20/2024    CHOL 115 07/28/2023    TRIG 146 07/28/2023    HDL 56.1 07/28/2023       Assessment and Plan   Madalyn Chavez is a 79 y.o. year old female with medical history of T2D, HTN, DLD, here for diabetes. BG control is at goal (A1c 7.0%), but discussed replacing repaglinide with an agent that does not have a risk of hypoglycemia - specifically SGLT2i or GLP1RA. Given her frequent UTIs, will not trial SGLT2i now. She is agreeable to trying GLP1RA.     # T2D:   - Continue metformin 1000 mg twice daily   - Stop repaglinide   - Start Trulicity 0.75 mg once weekly - possible side effects discussed.   - Lipids: Due  - Urine Alb/Cr: Due  - Eye exam: Due 8/2025 for dilated exam     RTC: 2 months to see if she is tolerating GLP1RA. Asked her to check BG 1-2 times daily and bring meter to her appt.      Maricruz Vaughn, DO   Endocrinology

## 2024-11-11 ENCOUNTER — APPOINTMENT (OUTPATIENT)
Dept: ENDOCRINOLOGY | Facility: CLINIC | Age: 79
End: 2024-11-11
Payer: MEDICARE

## 2024-11-11 VITALS
WEIGHT: 199 LBS | BODY MASS INDEX: 31.23 KG/M2 | HEART RATE: 94 BPM | HEIGHT: 67 IN | SYSTOLIC BLOOD PRESSURE: 164 MMHG | DIASTOLIC BLOOD PRESSURE: 86 MMHG

## 2024-11-11 DIAGNOSIS — E11.9 TYPE 2 DIABETES MELLITUS WITHOUT COMPLICATION, WITHOUT LONG-TERM CURRENT USE OF INSULIN (MULTI): ICD-10-CM

## 2024-11-11 DIAGNOSIS — E11.65 TYPE 2 DIABETES MELLITUS WITH HYPERGLYCEMIA, WITHOUT LONG-TERM CURRENT USE OF INSULIN: Primary | ICD-10-CM

## 2024-11-11 PROCEDURE — 99214 OFFICE O/P EST MOD 30 MIN: CPT | Performed by: STUDENT IN AN ORGANIZED HEALTH CARE EDUCATION/TRAINING PROGRAM

## 2024-11-11 PROCEDURE — 1123F ACP DISCUSS/DSCN MKR DOCD: CPT | Performed by: STUDENT IN AN ORGANIZED HEALTH CARE EDUCATION/TRAINING PROGRAM

## 2024-11-11 PROCEDURE — 3079F DIAST BP 80-89 MM HG: CPT | Performed by: STUDENT IN AN ORGANIZED HEALTH CARE EDUCATION/TRAINING PROGRAM

## 2024-11-11 PROCEDURE — 3077F SYST BP >= 140 MM HG: CPT | Performed by: STUDENT IN AN ORGANIZED HEALTH CARE EDUCATION/TRAINING PROGRAM

## 2024-11-11 PROCEDURE — 1159F MED LIST DOCD IN RCRD: CPT | Performed by: STUDENT IN AN ORGANIZED HEALTH CARE EDUCATION/TRAINING PROGRAM

## 2024-11-11 PROCEDURE — G2211 COMPLEX E/M VISIT ADD ON: HCPCS | Performed by: STUDENT IN AN ORGANIZED HEALTH CARE EDUCATION/TRAINING PROGRAM

## 2024-11-11 RX ORDER — BLOOD-GLUCOSE METER
EACH MISCELLANEOUS
Qty: 200 EACH | Refills: 3 | Status: SHIPPED | OUTPATIENT
Start: 2024-11-11

## 2024-11-11 RX ORDER — METFORMIN HYDROCHLORIDE 500 MG/1
1000 TABLET ORAL
Qty: 180 TABLET | Refills: 1 | Status: SHIPPED | OUTPATIENT
Start: 2024-11-11

## 2024-11-11 ASSESSMENT — ENCOUNTER SYMPTOMS
DEPRESSION: 1
LOSS OF SENSATION IN FEET: 0
OCCASIONAL FEELINGS OF UNSTEADINESS: 1

## 2024-11-11 NOTE — PATIENT INSTRUCTIONS
- Please get labs done   - Continue metformin   - Please stop repaglinide   - Start Trulicity 0.75 mg once weekly   - Check blood glucose 1-2 times daily. Bring your meter with you to your next appointment

## 2024-11-14 ENCOUNTER — LAB (OUTPATIENT)
Dept: LAB | Facility: LAB | Age: 79
End: 2024-11-14
Payer: MEDICARE

## 2024-11-14 DIAGNOSIS — E11.65 TYPE 2 DIABETES MELLITUS WITH HYPERGLYCEMIA, WITHOUT LONG-TERM CURRENT USE OF INSULIN: ICD-10-CM

## 2024-11-14 LAB
ALBUMIN SERPL BCP-MCNC: 3.9 G/DL (ref 3.4–5)
ALP SERPL-CCNC: 49 U/L (ref 33–136)
ALT SERPL W P-5'-P-CCNC: 12 U/L (ref 7–45)
ANION GAP SERPL CALC-SCNC: 14 MMOL/L (ref 10–20)
AST SERPL W P-5'-P-CCNC: 16 U/L (ref 9–39)
BILIRUB SERPL-MCNC: 0.5 MG/DL (ref 0–1.2)
BUN SERPL-MCNC: 19 MG/DL (ref 6–23)
CALCIUM SERPL-MCNC: 8.9 MG/DL (ref 8.6–10.6)
CHLORIDE SERPL-SCNC: 105 MMOL/L (ref 98–107)
CHOLEST SERPL-MCNC: 101 MG/DL (ref 0–199)
CHOLESTEROL/HDL RATIO: 1.7
CO2 SERPL-SCNC: 29 MMOL/L (ref 21–32)
CREAT SERPL-MCNC: 1 MG/DL (ref 0.5–1.05)
EGFRCR SERPLBLD CKD-EPI 2021: 58 ML/MIN/1.73M*2
GLUCOSE SERPL-MCNC: 126 MG/DL (ref 74–99)
HDLC SERPL-MCNC: 57.9 MG/DL
LDLC SERPL CALC-MCNC: 25 MG/DL
NON HDL CHOLESTEROL: 43 MG/DL (ref 0–149)
POTASSIUM SERPL-SCNC: 4.2 MMOL/L (ref 3.5–5.3)
PROT SERPL-MCNC: 6.1 G/DL (ref 6.4–8.2)
SODIUM SERPL-SCNC: 144 MMOL/L (ref 136–145)
TRIGL SERPL-MCNC: 93 MG/DL (ref 0–149)
VLDL: 19 MG/DL (ref 0–40)

## 2024-11-14 PROCEDURE — 80053 COMPREHEN METABOLIC PANEL: CPT

## 2024-11-14 PROCEDURE — 36415 COLL VENOUS BLD VENIPUNCTURE: CPT

## 2024-11-14 PROCEDURE — 80061 LIPID PANEL: CPT

## 2024-11-15 ENCOUNTER — APPOINTMENT (OUTPATIENT)
Dept: LAB | Facility: LAB | Age: 79
End: 2024-11-15
Payer: MEDICARE

## 2024-11-15 LAB
CREAT UR-MCNC: 62.6 MG/DL (ref 20–320)
MICROALBUMIN UR-MCNC: 43.8 MG/L
MICROALBUMIN/CREAT UR: 70 UG/MG CREAT

## 2024-11-15 PROCEDURE — 82570 ASSAY OF URINE CREATININE: CPT

## 2024-11-15 PROCEDURE — 82043 UR ALBUMIN QUANTITATIVE: CPT

## 2024-11-17 ASSESSMENT — EXTERNAL EXAM - RIGHT EYE: OD_EXAM: NORMAL

## 2024-11-17 ASSESSMENT — CUP TO DISC RATIO: OS_RATIO: .45

## 2024-11-17 ASSESSMENT — EXTERNAL EXAM - LEFT EYE: OS_EXAM: NORMAL

## 2024-11-18 NOTE — PROGRESS NOTES
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 Symptoms: Gradual decrease in vision over the past 1-2 years. Harder to read small print. More difficulty seeing small words/numbers on TV. Having more trouble seeing road signs when driving at night. Glare from headlights when driving at night. .  Discussed diagnosis with patient and option of YAG capsulotomy. Discussed procedure. Discussed potential risks, benefits, and alternatives, including but not limited to: pain, inflammation, edema, retinal tear/detachment, floaters, increased eye pressure, damage to other ocular structures, damage to/dislocation of lens implant, glare, need to repeat procedure, loss of vision or loss of eye. Patient understands and wishes to proceed. Consent signed.  YAG capsulotomy left eye done 11/19/24. Advised to use artificial tears PRN. F/u 4-8 weeks - refract OU, glare/BAT OD, dilate OU - Plan is for postop check OS and possible YAG capsulotomy OD. Possible need to enlarge opening OS vs possible YAG OD. D/w patient may need to repeat laser to enlarge opening in the future as needed. Call or return sooner if any redness, pain, decreased vision, flashes, or floaters.   Pulse = 83  TE = 83  Power = 1.0 mJ    Diabetes [id number]  -HbA1c= 7.0 (9/20/24). No diabetic retinopathy seen on exam. Continue close monitoring of blood glucose, blood pressure, and cholesterol. Plan for annual dilated eye exam.    Dry eyes, [id number]  -Feels that vision gets blurry as day goes on or after reading for some time  -Use warm compresses 2 times a day  -Use artificial tears 2-4 times a day and PRN    WrviwhyetF96.00  McwzyfftzaR03.4  -Continue current glasses for now.   -Refraction performed today for diagnostic purposes only and without specific intent to dispense Rx. Glasses  Rx not dispensed today.   -F/u 4-8 weeks - refract OU, glare/BAT OD, dilate OU - possible need to enlarge opening OS vs possible YAG OD.      No history of refractive surgery.   No FH of AMD/glaucoma

## 2024-11-19 ENCOUNTER — APPOINTMENT (OUTPATIENT)
Dept: OPHTHALMOLOGY | Facility: CLINIC | Age: 79
End: 2024-11-19
Payer: MEDICARE

## 2024-11-19 DIAGNOSIS — Z96.1 PSEUDOPHAKIA: ICD-10-CM

## 2024-11-19 DIAGNOSIS — H26.492 PCO (POSTERIOR CAPSULAR OPACIFICATION), LEFT: Primary | ICD-10-CM

## 2024-11-19 DIAGNOSIS — E11.9 DIABETES MELLITUS WITHOUT COMPLICATION (MULTI): ICD-10-CM

## 2024-11-19 DIAGNOSIS — H52.03 HYPEROPIA, BILATERAL: ICD-10-CM

## 2024-11-19 DIAGNOSIS — E11.65 TYPE 2 DIABETES MELLITUS WITH HYPERGLYCEMIA, WITHOUT LONG-TERM CURRENT USE OF INSULIN: Primary | ICD-10-CM

## 2024-11-19 DIAGNOSIS — H52.4 PRESBYOPIA: ICD-10-CM

## 2024-11-19 DIAGNOSIS — H26.491 PCO (POSTERIOR CAPSULAR OPACIFICATION), RIGHT: ICD-10-CM

## 2024-11-19 DIAGNOSIS — H04.123 DRY EYES, BILATERAL: ICD-10-CM

## 2024-11-19 PROCEDURE — 1160F RVW MEDS BY RX/DR IN RCRD: CPT | Performed by: OPHTHALMOLOGY

## 2024-11-19 PROCEDURE — 99214 OFFICE O/P EST MOD 30 MIN: CPT | Performed by: OPHTHALMOLOGY

## 2024-11-19 PROCEDURE — 1123F ACP DISCUSS/DSCN MKR DOCD: CPT | Performed by: OPHTHALMOLOGY

## 2024-11-19 PROCEDURE — 1159F MED LIST DOCD IN RCRD: CPT | Performed by: OPHTHALMOLOGY

## 2024-11-19 PROCEDURE — 66821 AFTER CATARACT LASER SURGERY: CPT | Mod: LEFT SIDE | Performed by: OPHTHALMOLOGY

## 2024-11-19 PROCEDURE — 1036F TOBACCO NON-USER: CPT | Performed by: OPHTHALMOLOGY

## 2024-11-19 ASSESSMENT — REFRACTION_WEARINGRX
OS_ADD: +1.00
OS_CYLINDER: -0.50
OD_CYLINDER: SPHER
OS_SPHERE: +1.50
OD_SPHERE: +1.25
SPECS_TYPE: BIFOCAL
OD_ADD: +1.00
OS_AXIS: 045

## 2024-11-19 ASSESSMENT — ENCOUNTER SYMPTOMS
EYES NEGATIVE: 1
PSYCHIATRIC NEGATIVE: 0
CONSTITUTIONAL NEGATIVE: 0
RESPIRATORY NEGATIVE: 0
NEUROLOGICAL NEGATIVE: 0
GASTROINTESTINAL NEGATIVE: 0
ALLERGIC/IMMUNOLOGIC NEGATIVE: 0
HEMATOLOGIC/LYMPHATIC NEGATIVE: 0
ENDOCRINE NEGATIVE: 0
CARDIOVASCULAR NEGATIVE: 0
MUSCULOSKELETAL NEGATIVE: 0

## 2024-11-19 ASSESSMENT — REFRACTION_MANIFEST
OS_CYLINDER: -0.50
OD_ADD: +2.50
OS_AXIS: 045
OS_SPHERE: +1.50
OD_CYLINDER: SPHER
OD_SPHERE: +1.25
OS_ADD: +2.50

## 2024-11-19 ASSESSMENT — VISUAL ACUITY
OS_CC: 20/50
METHOD: SNELLEN - LINEAR
OS_BAT_MED: 20/70
CORRECTION_TYPE: GLASSES
OD_CC: 20/30

## 2024-11-19 ASSESSMENT — TONOMETRY
IOP_METHOD: GOLDMANN APPLANATION
OS_IOP_MMHG: 14
OD_IOP_MMHG: 14

## 2024-11-26 ENCOUNTER — APPOINTMENT (OUTPATIENT)
Dept: PRIMARY CARE | Facility: CLINIC | Age: 79
End: 2024-11-26
Payer: MEDICARE

## 2024-11-26 VITALS
TEMPERATURE: 97.9 F | SYSTOLIC BLOOD PRESSURE: 144 MMHG | BODY MASS INDEX: 30.29 KG/M2 | HEIGHT: 67 IN | WEIGHT: 193 LBS | DIASTOLIC BLOOD PRESSURE: 84 MMHG

## 2024-11-26 DIAGNOSIS — J01.00 ACUTE NON-RECURRENT MAXILLARY SINUSITIS: ICD-10-CM

## 2024-11-26 DIAGNOSIS — E78.49 OTHER HYPERLIPIDEMIA: ICD-10-CM

## 2024-11-26 DIAGNOSIS — R53.1 WEAKNESS: ICD-10-CM

## 2024-11-26 DIAGNOSIS — Z00.00 MEDICARE ANNUAL WELLNESS VISIT, SUBSEQUENT: Primary | ICD-10-CM

## 2024-11-26 DIAGNOSIS — I10 PRIMARY HYPERTENSION: ICD-10-CM

## 2024-11-26 DIAGNOSIS — R60.0 BILATERAL LEG EDEMA: ICD-10-CM

## 2024-11-26 DIAGNOSIS — E11.9 TYPE 2 DIABETES MELLITUS WITHOUT COMPLICATION, WITHOUT LONG-TERM CURRENT USE OF INSULIN (MULTI): ICD-10-CM

## 2024-11-26 PROCEDURE — 1159F MED LIST DOCD IN RCRD: CPT | Performed by: INTERNAL MEDICINE

## 2024-11-26 PROCEDURE — 1123F ACP DISCUSS/DSCN MKR DOCD: CPT | Performed by: INTERNAL MEDICINE

## 2024-11-26 PROCEDURE — 1036F TOBACCO NON-USER: CPT | Performed by: INTERNAL MEDICINE

## 2024-11-26 PROCEDURE — G0439 PPPS, SUBSEQ VISIT: HCPCS | Performed by: INTERNAL MEDICINE

## 2024-11-26 PROCEDURE — 3079F DIAST BP 80-89 MM HG: CPT | Performed by: INTERNAL MEDICINE

## 2024-11-26 PROCEDURE — 3077F SYST BP >= 140 MM HG: CPT | Performed by: INTERNAL MEDICINE

## 2024-11-26 PROCEDURE — 1160F RVW MEDS BY RX/DR IN RCRD: CPT | Performed by: INTERNAL MEDICINE

## 2024-11-26 PROCEDURE — 1170F FXNL STATUS ASSESSED: CPT | Performed by: INTERNAL MEDICINE

## 2024-11-26 PROCEDURE — 99214 OFFICE O/P EST MOD 30 MIN: CPT | Performed by: INTERNAL MEDICINE

## 2024-11-26 RX ORDER — GUAIFENESIN 600 MG/1
1200 TABLET, EXTENDED RELEASE ORAL 2 TIMES DAILY
Qty: 30 TABLET | Refills: 0 | Status: SHIPPED | OUTPATIENT
Start: 2024-11-26 | End: 2025-11-26

## 2024-11-26 RX ORDER — HYDROCHLOROTHIAZIDE 12.5 MG/1
12.5 TABLET ORAL DAILY
Qty: 90 TABLET | Refills: 1 | Status: SHIPPED | OUTPATIENT
Start: 2024-11-26

## 2024-11-26 RX ORDER — FLUTICASONE PROPIONATE 50 MCG
1 SPRAY, SUSPENSION (ML) NASAL DAILY
Qty: 16 G | Refills: 1 | Status: SHIPPED | OUTPATIENT
Start: 2024-11-26 | End: 2025-11-26

## 2024-11-26 ASSESSMENT — ENCOUNTER SYMPTOMS
BACK PAIN: 1
ABDOMINAL PAIN: 0
LOSS OF SENSATION IN FEET: 0
FATIGUE: 1
FREQUENCY: 0
DIFFICULTY URINATING: 0
SLEEP DISTURBANCE: 0
SINUS PRESSURE: 1
ARTHRALGIAS: 1
SHORTNESS OF BREATH: 0
DIARRHEA: 1
FEVER: 0
CONSTIPATION: 0
DEPRESSION: 0
COUGH: 1
CHEST TIGHTNESS: 0
NAUSEA: 0
CHILLS: 0
FLU SYMPTOMS: 1
SINUS PAIN: 1
PALPITATIONS: 0
OCCASIONAL FEELINGS OF UNSTEADINESS: 1

## 2024-11-26 ASSESSMENT — ACTIVITIES OF DAILY LIVING (ADL)
BATHING: INDEPENDENT
TAKING_MEDICATION: INDEPENDENT
GROCERY_SHOPPING: INDEPENDENT
MANAGING_FINANCES: INDEPENDENT
DOING_HOUSEWORK: INDEPENDENT
DRESSING: INDEPENDENT

## 2024-11-26 ASSESSMENT — PATIENT HEALTH QUESTIONNAIRE - PHQ9
2. FEELING DOWN, DEPRESSED OR HOPELESS: NOT AT ALL
SUM OF ALL RESPONSES TO PHQ9 QUESTIONS 1 AND 2: 0
1. LITTLE INTEREST OR PLEASURE IN DOING THINGS: NOT AT ALL

## 2024-11-26 NOTE — PATIENT INSTRUCTIONS
You are seen today for Medicare wellness visit  For your swelling you can take extra half tablet of hydrochlorothiazide to help lower the leg swelling.  Continue with compression stockings.  Continue current medications  Refer for physical therapy  Follow-up in 3 to 4 months  Ways to Help Prevent Falls at Home    Quick Tips   ? Ask for help if you need it. Most people want to help!   ? Get up slowly after sitting or laying down   ? Wear a medical alert device or keep cell phone in your pocket   ? Use night lights, especially areas near a bathroom   ? Keep the items you use often within reach on a small stool or end table   ? Use an assistive device such as walker or cane, as directed by provider/physical therapy   ? Use a non-slip mat and grab bars in your bathroom. Look for home health sections for best options     Other Areas to Focus On   ? Exercise and nutrition: Regular exercise or taking a falls prevention class are great ways improve strength and balance. Don’t forget to stay hydrated and bring a snack!   ? Medicine side effects: Some medicines can make you sleepy or dizzy, which could cause a fall. Ask your healthcare provider about the side effects your medicines could cause. Be sure to let them know if you take any vitamins or supplements as well.   ? Tripping hazards: Remove items you could trip on, such as loose mats, rugs, cords, and clutter. Wear closed toe shoes with rubber soles.   ? Health and wellness: Get regular checkups with your healthcare provider, plus routine vision and hearing screenings. Talk with your healthcare provider about:   o Your medicines and the possible side effects - bring them in a bag if that is easier!   o Problems with balance or feeling dizzy   o Ways to promote bone health, such as Vitamin D and calcium supplements   o Questions or concerns about falling     *Ask your healthcare team if you have questions     [facility], 2022

## 2024-11-26 NOTE — PROGRESS NOTES
"Subjective   Patient ID: Madalyn Chavez is a 79 y.o. female who presents for Medicare Annual Wellness Visit Subsequent and Flu Symptoms (Sinus drainage, cough).    Flu Symptoms  Associated symptoms include arthralgias, congestion, coughing and fatigue. Pertinent negatives include no abdominal pain, chest pain, chills, fever or nausea.   Ms. Chavez is seen today for Medicare wellness visit and follow-up on her recent flu symptoms.  She has been feeling sick for last few days with runny nose, congestion.  She denies sinus headache and congestion.  No fever or chills.  She has cough and occasional shortness of breath.  Medical history is significant for hypertension, hyperlipidemia, depression.  She is taking her medications regularly.  She was diagnosed with cholelithiasis 3 months ago when she was seen by general surgery Dr. Lawler.  Her symptoms had improved.  She denies any abdominal pain today.    Review of Systems   Constitutional:  Positive for fatigue. Negative for chills and fever.   HENT:  Positive for congestion, sinus pressure and sinus pain.    Respiratory:  Positive for cough. Negative for chest tightness and shortness of breath.    Cardiovascular:  Positive for leg swelling. Negative for chest pain and palpitations.   Gastrointestinal:  Positive for diarrhea. Negative for abdominal pain, constipation and nausea.   Genitourinary:  Negative for difficulty urinating, frequency and urgency.   Musculoskeletal:  Positive for arthralgias, back pain and gait problem.   Psychiatric/Behavioral:  Negative for sleep disturbance.        Objective   /84 (BP Location: Right arm, Patient Position: Sitting)   Temp 36.6 °C (97.9 °F)   Ht 1.702 m (5' 7\")   Wt 87.5 kg (193 lb)   BMI 30.23 kg/m²     Physical Exam  Vitals reviewed.   Constitutional:       General: She is not in acute distress.     Appearance: Normal appearance.   HENT:      Head: Normocephalic and atraumatic.   Cardiovascular:      Rate and " Rhythm: Normal rate and regular rhythm.      Pulses: Normal pulses.   Pulmonary:      Effort: Pulmonary effort is normal. No respiratory distress.      Breath sounds: Normal breath sounds.   Abdominal:      General: Bowel sounds are normal. There is no distension.      Tenderness: There is no abdominal tenderness.   Musculoskeletal:         General: Swelling and tenderness present. Normal range of motion.      Comments: 3+leg edema   Skin:     General: Skin is warm.   Neurological:      General: No focal deficit present.      Mental Status: She is alert.      Coordination: Coordination normal.      Gait: Gait normal.   Psychiatric:         Mood and Affect: Mood normal.         Behavior: Behavior normal.         Assessment/Plan   Diagnoses and all orders for this visit:  Medicare annual wellness visit, subsequent  Comments:  Medicare wellness visit completed  Physical therapy ordered for weakness and deconditioning  Other hyperlipidemia  Type 2 diabetes mellitus without complication, without long-term current use of insulin (Multi)  Comments:  Recent hemoglobin A1c 7.0  Continue to follow-up with endocrinology  Continue repaglinide and metformin  Primary hypertension  Comments:  Blood pressure slightly elevated  Continue lisinopril, hydrochlorothiazide  Orders:  -     hydroCHLOROthiazide (Microzide) 12.5 mg tablet; Take 1 tablet (12.5 mg) by mouth once daily.  Weakness  Comments:  Refer for physical therapy  Orders:  -     Referral to Physical Therapy; Future  Acute non-recurrent maxillary sinusitis  Comments:  Take Flonase nasal spray  Continue Mucinex as needed for cough  Orders:  -     fluticasone (Flonase) 50 mcg/actuation nasal spray; Administer 1 spray into each nostril once daily. Shake gently. Before first use, prime pump. After use, clean tip and replace cap.  -     guaiFENesin (Mucinex) 600 mg 12 hr tablet; Take 2 tablets (1,200 mg) by mouth 2 times a day. Do not crush, chew, or split.  Bilateral leg  edema  Comments:  Continue HCTZ  Patient can take 2 tablets of HCT 12.5 for worsening edema       Patient was identified as a fall risk. Risk prevention instructions provided.

## 2024-12-12 DIAGNOSIS — F32.A DEPRESSION, UNSPECIFIED DEPRESSION TYPE: ICD-10-CM

## 2024-12-13 RX ORDER — PAROXETINE HYDROCHLORIDE 40 MG/1
40 TABLET, FILM COATED ORAL DAILY
Qty: 90 TABLET | Refills: 1 | Status: SHIPPED | OUTPATIENT
Start: 2024-12-13

## 2024-12-18 RX ORDER — SODIUM CHLORIDE 9 MG/ML
10 INJECTION, SOLUTION INTRAMUSCULAR; INTRAVENOUS; SUBCUTANEOUS ONCE
Status: COMPLETED | OUTPATIENT
Start: 2024-12-19 | End: 2024-12-19

## 2024-12-18 RX ORDER — LIDOCAINE HYDROCHLORIDE 10 MG/ML
5 INJECTION, SOLUTION INFILTRATION; PERINEURAL ONCE
Status: COMPLETED | OUTPATIENT
Start: 2024-12-19 | End: 2024-12-19

## 2024-12-18 RX ORDER — PHENAZOPYRIDINE HYDROCHLORIDE 200 MG/1
200 TABLET, FILM COATED ORAL ONCE
Status: COMPLETED | OUTPATIENT
Start: 2024-12-19 | End: 2024-12-19

## 2024-12-18 RX ORDER — LIDOCAINE HYDROCHLORIDE 20 MG/ML
1 JELLY TOPICAL ONCE
Status: COMPLETED | OUTPATIENT
Start: 2024-12-19 | End: 2024-12-19

## 2024-12-19 ENCOUNTER — APPOINTMENT (OUTPATIENT)
Dept: OBSTETRICS AND GYNECOLOGY | Facility: CLINIC | Age: 79
End: 2024-12-19
Payer: MEDICARE

## 2024-12-19 VITALS — SYSTOLIC BLOOD PRESSURE: 138 MMHG | DIASTOLIC BLOOD PRESSURE: 80 MMHG | BODY MASS INDEX: 29.6 KG/M2 | WEIGHT: 189 LBS

## 2024-12-19 DIAGNOSIS — E11.9 TYPE 2 DIABETES MELLITUS WITHOUT COMPLICATION, WITHOUT LONG-TERM CURRENT USE OF INSULIN (MULTI): ICD-10-CM

## 2024-12-19 DIAGNOSIS — N39.0 ACUTE LOWER UTI: Primary | ICD-10-CM

## 2024-12-19 DIAGNOSIS — N39.41 URGE INCONTINENCE: ICD-10-CM

## 2024-12-19 LAB
POC APPEARANCE, URINE: CLEAR
POC BILIRUBIN, URINE: NEGATIVE
POC BLOOD, URINE: NEGATIVE
POC COLOR, URINE: YELLOW
POC GLUCOSE, URINE: NEGATIVE MG/DL
POC KETONES, URINE: NEGATIVE MG/DL
POC LEUKOCYTES, URINE: ABNORMAL
POC NITRITE,URINE: NEGATIVE
POC PH, URINE: 6 PH
POC PROTEIN, URINE: NEGATIVE MG/DL
POC SPECIFIC GRAVITY, URINE: 1.01
POC UROBILINOGEN, URINE: 1 EU/DL

## 2024-12-19 PROCEDURE — 52287 CYSTOSCOPY CHEMODENERVATION: CPT | Performed by: OBSTETRICS & GYNECOLOGY

## 2024-12-19 PROCEDURE — 99213 OFFICE O/P EST LOW 20 MIN: CPT | Performed by: OBSTETRICS & GYNECOLOGY

## 2024-12-19 PROCEDURE — 2500000005 HC RX 250 GENERAL PHARMACY W/O HCPCS: Performed by: OBSTETRICS & GYNECOLOGY

## 2024-12-19 PROCEDURE — 99213 OFFICE O/P EST LOW 20 MIN: CPT | Mod: 25 | Performed by: OBSTETRICS & GYNECOLOGY

## 2024-12-19 PROCEDURE — 81003 URINALYSIS AUTO W/O SCOPE: CPT | Performed by: OBSTETRICS & GYNECOLOGY

## 2024-12-19 PROCEDURE — 2500000002 HC RX 250 W HCPCS SELF ADMINISTERED DRUGS (ALT 637 FOR MEDICARE OP, ALT 636 FOR OP/ED): Performed by: OBSTETRICS & GYNECOLOGY

## 2024-12-19 PROCEDURE — 96372 THER/PROPH/DIAG INJ SC/IM: CPT | Performed by: OBSTETRICS & GYNECOLOGY

## 2024-12-19 PROCEDURE — 2500000004 HC RX 250 GENERAL PHARMACY W/ HCPCS (ALT 636 FOR OP/ED): Mod: JW,JG | Performed by: OBSTETRICS & GYNECOLOGY

## 2024-12-19 RX ORDER — GRANULES FOR ORAL 3 G/1
3 POWDER ORAL ONCE
Qty: 3 G | Refills: 0 | Status: SHIPPED | OUTPATIENT
Start: 2024-12-19 | End: 2024-12-19

## 2024-12-19 RX ORDER — METFORMIN HYDROCHLORIDE 500 MG/1
1000 TABLET ORAL
Qty: 180 TABLET | Refills: 6 | Status: SHIPPED | OUTPATIENT
Start: 2024-12-19

## 2024-12-19 ASSESSMENT — PAIN SCALES - GENERAL: PAINLEVEL_OUTOF10: 0-NO PAIN

## 2024-12-19 NOTE — PROGRESS NOTES
HPI:  Pt here for intradetrusor Botox injection  3/2024 100 U  180 U 9/2023     The patient gave a urine sample which was checked for infection and found to be negative.  Pt was numbed for 20 min with lidojet inserted in to the bladder and given 100 mg po pyridium.    The patient was consented for cytoscopic intradetrusor Botox injection.    Siognificant sediment was note din the bladder when the scope was inserted so this was irrigated out with 10 mL NS and a st cath. Cysto was then resumed.    180   Units was reconstituted in  9   mL of NS  Using a flexible scope injection was performed at 4 sites using the Laborie needle at a 3 mm depth starting in the midline just above the intraureteric ridge and then to the left and right of this site and then just above until the entire drug volume was injected followed by a small amount of flush.    No bleeding was noted    The procedure was completed, the patient allowed to empty her bladder and get dressed.    Post-procedure precautions were given to the patient and fosfomycin 3 g sent x 1 dose.    She will follow-up with Kalyani Barboza in 1 month.    [facility]     Wound Care (No Sutures): Petrolatum

## 2025-01-07 DIAGNOSIS — N32.81 OVERACTIVE BLADDER: ICD-10-CM

## 2025-01-08 RX ORDER — FESOTERODINE FUMARATE 8 MG/1
TABLET, FILM COATED, EXTENDED RELEASE ORAL
Qty: 30 TABLET | Refills: 6 | Status: SHIPPED | OUTPATIENT
Start: 2025-01-08

## 2025-01-08 NOTE — TELEPHONE ENCOUNTER
Request received for   Requested Prescriptions     Pending Prescriptions Disp Refills    fesoterodine 8 mg tablet extended release 24 hr [Pharmacy Med Name: Fesoterodine Fumarate ER Oral Tablet Extended Release 24 Hour 8 MG] 30 tablet 0     Sig: TAKE 1 TABLET BY MOUTH EARLY IN THE MORNING       Madalyn Chavez was last seen 12/19/2024 and has an appt for 1/22/2025.

## 2025-01-21 ENCOUNTER — APPOINTMENT (OUTPATIENT)
Dept: OPHTHALMOLOGY | Facility: CLINIC | Age: 80
End: 2025-01-21
Payer: MEDICARE

## 2025-01-22 ENCOUNTER — OFFICE VISIT (OUTPATIENT)
Dept: OBSTETRICS AND GYNECOLOGY | Facility: CLINIC | Age: 80
End: 2025-01-22
Payer: MEDICARE

## 2025-01-22 VITALS
DIASTOLIC BLOOD PRESSURE: 83 MMHG | HEIGHT: 67 IN | SYSTOLIC BLOOD PRESSURE: 130 MMHG | HEART RATE: 92 BPM | WEIGHT: 185.4 LBS | BODY MASS INDEX: 29.1 KG/M2

## 2025-01-22 DIAGNOSIS — N39.41 URGE INCONTINENCE: ICD-10-CM

## 2025-01-22 DIAGNOSIS — N39.0 ACUTE LOWER UTI: ICD-10-CM

## 2025-01-22 DIAGNOSIS — R33.9 URINARY RETENTION: Primary | ICD-10-CM

## 2025-01-22 LAB
POC APPEARANCE, URINE: CLEAR
POC BILIRUBIN, URINE: NEGATIVE
POC BLOOD, URINE: NEGATIVE
POC COLOR, URINE: ABNORMAL
POC GLUCOSE, URINE: NEGATIVE MG/DL
POC KETONES, URINE: NEGATIVE MG/DL
POC LEUKOCYTES, URINE: ABNORMAL
POC NITRITE,URINE: POSITIVE
POC PH, URINE: 6 PH
POC PROTEIN, URINE: ABNORMAL MG/DL
POC SPECIFIC GRAVITY, URINE: 1.02
POC UROBILINOGEN, URINE: 0.2 EU/DL

## 2025-01-22 PROCEDURE — 3075F SYST BP GE 130 - 139MM HG: CPT | Performed by: NURSE PRACTITIONER

## 2025-01-22 PROCEDURE — 51798 US URINE CAPACITY MEASURE: CPT | Performed by: NURSE PRACTITIONER

## 2025-01-22 PROCEDURE — 3079F DIAST BP 80-89 MM HG: CPT | Performed by: NURSE PRACTITIONER

## 2025-01-22 PROCEDURE — 1123F ACP DISCUSS/DSCN MKR DOCD: CPT | Performed by: NURSE PRACTITIONER

## 2025-01-22 PROCEDURE — 1159F MED LIST DOCD IN RCRD: CPT | Performed by: NURSE PRACTITIONER

## 2025-01-22 PROCEDURE — 87086 URINE CULTURE/COLONY COUNT: CPT | Performed by: NURSE PRACTITIONER

## 2025-01-22 PROCEDURE — 99213 OFFICE O/P EST LOW 20 MIN: CPT | Performed by: NURSE PRACTITIONER

## 2025-01-22 PROCEDURE — 1126F AMNT PAIN NOTED NONE PRSNT: CPT | Performed by: NURSE PRACTITIONER

## 2025-01-22 PROCEDURE — 81003 URINALYSIS AUTO W/O SCOPE: CPT | Performed by: NURSE PRACTITIONER

## 2025-01-22 ASSESSMENT — PAIN SCALES - GENERAL: PAINLEVEL_OUTOF10: 0-NO PAIN

## 2025-01-24 DIAGNOSIS — E11.9 TYPE 2 DIABETES MELLITUS WITHOUT COMPLICATION, WITHOUT LONG-TERM CURRENT USE OF INSULIN (MULTI): Primary | ICD-10-CM

## 2025-01-24 DIAGNOSIS — N39.0 ACUTE LOWER UTI: Primary | ICD-10-CM

## 2025-01-24 LAB — BACTERIA UR CULT: ABNORMAL

## 2025-01-24 RX ORDER — GRANULES FOR ORAL 3 G/1
3 POWDER ORAL ONCE
Qty: 3 G | Refills: 0 | Status: SHIPPED | OUTPATIENT
Start: 2025-01-24 | End: 2025-01-24

## 2025-01-28 RX ORDER — REPAGLINIDE 1 MG/1
1 TABLET ORAL 3 TIMES DAILY
Qty: 90 TABLET | Refills: 2 | Status: SHIPPED | OUTPATIENT
Start: 2025-01-28

## 2025-01-29 ENCOUNTER — OFFICE VISIT (OUTPATIENT)
Dept: OBSTETRICS AND GYNECOLOGY | Facility: CLINIC | Age: 80
End: 2025-01-29
Payer: MEDICARE

## 2025-01-29 VITALS
DIASTOLIC BLOOD PRESSURE: 67 MMHG | HEIGHT: 67 IN | WEIGHT: 181.6 LBS | BODY MASS INDEX: 28.5 KG/M2 | HEART RATE: 94 BPM | SYSTOLIC BLOOD PRESSURE: 109 MMHG

## 2025-01-29 DIAGNOSIS — N39.41 URGE INCONTINENCE: ICD-10-CM

## 2025-01-29 LAB
POC APPEARANCE, URINE: CLEAR
POC BILIRUBIN, URINE: NEGATIVE
POC BLOOD, URINE: NEGATIVE
POC COLOR, URINE: ABNORMAL
POC GLUCOSE, URINE: NEGATIVE MG/DL
POC KETONES, URINE: NEGATIVE MG/DL
POC LEUKOCYTES, URINE: ABNORMAL
POC NITRITE,URINE: NEGATIVE
POC PH, URINE: 7 PH
POC PROTEIN, URINE: NEGATIVE MG/DL
POC SPECIFIC GRAVITY, URINE: 1.01
POC UROBILINOGEN, URINE: 0.2 EU/DL

## 2025-01-29 PROCEDURE — 81003 URINALYSIS AUTO W/O SCOPE: CPT | Mod: QW | Performed by: NURSE PRACTITIONER

## 2025-01-29 PROCEDURE — 99212 OFFICE O/P EST SF 10 MIN: CPT | Performed by: NURSE PRACTITIONER

## 2025-01-29 PROCEDURE — 3074F SYST BP LT 130 MM HG: CPT | Performed by: NURSE PRACTITIONER

## 2025-01-29 PROCEDURE — 1126F AMNT PAIN NOTED NONE PRSNT: CPT | Performed by: NURSE PRACTITIONER

## 2025-01-29 PROCEDURE — 3078F DIAST BP <80 MM HG: CPT | Performed by: NURSE PRACTITIONER

## 2025-01-29 PROCEDURE — 1123F ACP DISCUSS/DSCN MKR DOCD: CPT | Performed by: NURSE PRACTITIONER

## 2025-01-29 PROCEDURE — 1159F MED LIST DOCD IN RCRD: CPT | Performed by: NURSE PRACTITIONER

## 2025-01-29 ASSESSMENT — PAIN SCALES - GENERAL: PAINLEVEL_OUTOF10: 0-NO PAIN

## 2025-01-29 NOTE — PROGRESS NOTES
Madalyn Chavez presents for evaluation of urinary retention and management of urinary symptoms. Reports ability to empty her bladder well, states she has been voiding regularly all week. States she drank water on her way to the clinic. Reports wearing diapers instead of pads due to leakage. Notes a decrease in frequency during the day, she can go over 4 hours without urinating. For the past couple of nights, she was able to sleep for more than 5 hours without nocturia. Desires to continue Botox treatment.     Laboratory:   Urine dipstick shows negative.      PVR: 160 ml    Review of Systems   Constitutional: Negative.    HENT: Negative.     Eyes: Negative.    Respiratory: Negative.     Cardiovascular: Negative.    Gastrointestinal: Negative.    Endocrine: Negative.    Genitourinary: Negative.    Musculoskeletal: Negative.    Skin: Negative.    Allergic/Immunologic: Negative.    Neurological: Negative.    Hematological: Negative.    Psychiatric/Behavioral: Negative.          Physical Exam  Constitutional:       Appearance: Normal appearance.   HENT:      Head: Normocephalic and atraumatic.   Neurological:      General: No focal deficit present.      Mental Status: She is alert and oriented to person, place, and time.   Psychiatric:         Mood and Affect: Mood normal.         Behavior: Behavior normal.         Thought Content: Thought content normal.         Judgment: Judgment normal.          Discussion  Counseling regarding anti-cholinergic medication: This patient has been prescribed or is currently on an anti-cholinergic medication for treatment of overactive bladder/urge urinary incontinence. The patient was screened for contraindications to use. We reviewed indication for use, potential common side effects, and instructions for use. We reviewed association of anti-cholinergic medication use with increased risk of developing dementia. Alternatives to anti-cholinergic medication and different  anti-cholinergic medications were discussed, including theoretical decreased risk with certain anti-cholinergics, and offered as appropriate for the patient. The patient expressed her understanding of the counseling provided.   The patient's expressed preference is:? Continue Myrbetriq 50 mg  Has been on OAB med in the past? Yes How many? 1-2 Toviaz 4 mg; Toviaz 8 mg       Assessment and Plan  79 y.o. female with urge incontinence. Comorbidities include: HTN, HLD, DM, Obesity, GERD, Hypoestrogenism, Osteopenia, CVA, Edema.     Diagnosis List:  #1 Urge incontinence    Plan:  1. Urge incontinence  - PVR measured, retention was ruled out.   - Continue Myrbetriq 50 mg. Monitor for symptoms of urinary retention and adjust treatment as necessary.   - Continue Botox treatment with Dr. Borja.   - Encouraged to void regularly and monitor fluid intake.     Follow up in June with Dr. Borja for next Botox treatment.         Scribe Attestation  By signing my name below, IMadeleine Scribe, attest that this documentation has been prepared under the direction and in the presence of REAGAN Voss on 01/29/2025 at 12:24 AM.     REAGAN Voss

## 2025-01-30 ASSESSMENT — ENCOUNTER SYMPTOMS
EYES NEGATIVE: 1
MUSCULOSKELETAL NEGATIVE: 1
HEMATOLOGIC/LYMPHATIC NEGATIVE: 1
GASTROINTESTINAL NEGATIVE: 1
ENDOCRINE NEGATIVE: 1
ALLERGIC/IMMUNOLOGIC NEGATIVE: 1
PSYCHIATRIC NEGATIVE: 1
NEUROLOGICAL NEGATIVE: 1
CONSTITUTIONAL NEGATIVE: 1
CARDIOVASCULAR NEGATIVE: 1
RESPIRATORY NEGATIVE: 1

## 2025-02-01 DIAGNOSIS — E11.9 TYPE 2 DIABETES MELLITUS WITHOUT COMPLICATION, WITHOUT LONG-TERM CURRENT USE OF INSULIN (MULTI): ICD-10-CM

## 2025-02-03 RX ORDER — BLOOD-GLUCOSE METER
EACH MISCELLANEOUS
Qty: 100 EACH | Refills: 3 | Status: SHIPPED | OUTPATIENT
Start: 2025-02-03

## 2025-02-09 NOTE — PROGRESS NOTES
Endocrinology  2/11/2024    History of Present Illness   Madalyn Chavez is a 79 y.o. year old female with medical history of T2D, HTN, DLD, Meniere's disease, here for diabetes.     LV: 11/11/2024  Doing well since LV.   No change in overall health.   Had a fall at home a week ago and in a snow bank trying to get to her car.   She thinks her Meniere's disease is causing balance issues.   Also having some cataract related issues - seeing provider next week.     Current regimen:   - Metformin 1000 mg twice daily   - Replaglinide 1 mg TID AC     She never started Trulicity that was prescribed at last visit.   Her credit card was hacked and canceled.   Her mail in pharmacy order credit card number was not updated and she hasn't been receiving medications     SMBG: Finger sticks        Hypoglycemia: Occasionally -  in 70s   Hypoglycemia awareness: Yes    Weight changes: Weight has been up and down - currently back to weight she was 10/2024     Denies any increased thirst, urination, or blurry vision.     Diet:   - Eats 2-3 meals a day. Sometimes skips breakfast if she gets up late   - 1 can of pepsi a day     Under a lot of stress lately -  dealing with heart problems.     DM history:   Year/age at diagnosis: Unsure - ~20 years ago    Prior medications: Denies   Previous hospitalizations for hyperglycemia: Denies   Complications: Denies     Last eye exam: 8/2024 - no retinopathy   Last foot exam: Podiatry every 3-4 months      No history of pancreatitis   No family history of MTC   She reports several UTIs this year - follows with urology.     Medications     Current Outpatient Medications   Medication Instructions    albuterol (ProAir HFA) 90 mcg/actuation inhaler Inhale.    aspirin 81 mg EC tablet 1 tablet, Daily    blood sugar diagnostic (OneTouch Verio test strips) strip No dose, route, or frequency recorded.    blood sugar diagnostic (OneTouch Verio test strips) strip Please use to check blood glucose 3  times daily    blood sugar diagnostic (OneTouch Verio test strips) strip USE TO TEST ONCE DAILY    cholecalciferol (Vitamin D-3) 50 MCG (2000 UT) tablet Take by mouth.    diazePAM (VALIUM) 5 mg, oral, Once    diclofenac sodium (VOLTAREN) 4 g, 2 times daily PRN    dulaglutide (TRULICITY) 0.75 mg, subcutaneous, Weekly    fenofibrate (TRICOR) 48 mg, oral, Daily    fluticasone (Flonase) 50 mcg/actuation nasal spray 1 spray, Each Nostril, Daily, Shake gently. Before first use, prime pump. After use, clean tip and replace cap.    FreeStyle glucose monitoring kit 1 each, As needed    FreeStyle Gypsy 2 Sensor kit Use as instructed    guaiFENesin (MUCINEX) 1,200 mg, oral, 2 times daily, Do not crush, chew, or split.    hydroCHLOROthiazide (Microzide) 12.5 mg capsule Take by mouth.    hydroCHLOROthiazide (MICROZIDE) 12.5 mg, oral, Daily    lancets (OneTouch UltraSoft Lancets) misc No dose, route, or frequency recorded.    lisinopril 10 mg tablet TAKE 1 TABLET DAILY    meloxicam (MOBIC) 15 mg, oral, Daily    metFORMIN (GLUCOPHAGE) 1,000 mg, oral, 2 times daily (morning and late afternoon)    Myrbetriq 50 mg, oral, Daily    omeprazole (PriLOSEC) 40 mg DR capsule TAKE 1 CAPSULE DAILY BEFOREA MEAL. PLEASE MAKE AN     APPOINTMENT WITH YOUR      DOCTOR    OneTouch Ultra Test strip 102 strips, miscellaneous, 2 times daily    PARoxetine (PAXIL) 40 mg, oral, Daily    repaglinide (PRANDIN) 1 mg, oral, 3 times daily    simvastatin (Zocor) 20 mg tablet TAKE 1 TABLET AT BEDTIME    triamcinolone (Kenalog) 0.025 % cream Apply twice daily to affected areas of hands (avoid face, groin, body folds) for 2 weeks, then taper to twice daily on weekends only; repeat every 6-8 weeks as needed for flares        History     Past Medical History:   Diagnosis Date    Cataract     Diabetes mellitus (Multi)     Dry eyes     Dysphagia, unspecified 12/05/2013    Dysphagia    Encounter for screening mammogram for malignant neoplasm of breast     Visit for  screening mammogram    Localized edema 08/11/2016    Edema extremities    Other specified complication of genitourinary prosthetic devices, implants and grafts, initial encounter (CMS-[facility]) 08/27/2018    Vaginal erosion secondary to pessary use, initial encounter    Personal history of diseases of the skin and subcutaneous tissue 09/05/2013    History of contact dermatitis    Personal history of other diseases of the circulatory system 10/31/2014    History of hypertension    Personal history of other diseases of the nervous system and sense organs     History of cataract    Personal history of other diseases of urinary system     History of bladder problems    Personal history of other mental and behavioral disorders 08/09/2013    History of anxiety disorder       Past Surgical History:   Procedure Laterality Date    CATARACT EXTRACTION  10/31/2014    Cataract Surgery    COLONOSCOPY  03/06/2013    Complete Colonoscopy    DILATION AND CURETTAGE OF UTERUS  03/06/2013    Dilation And Curettage    DILATION AND CURETTAGE OF UTERUS  09/16/2016    Dilation And Curettage    HERNIA REPAIR  03/06/2013    Hernia Repair    HERNIA REPAIR  09/16/2016    Hernia Repair    OTHER SURGICAL HISTORY  08/30/2022    Cataract surgery    TONSILLECTOMY  10/31/2014    Tonsillectomy       Family History   Problem Relation Name Age of Onset    Hypertension Mother      Other (stroke syndrome) Mother      Other (cardiovascular disorder) Father      Coronary artery disease Brother          Allergies   Allergen Reactions    Penicillins Shortness of breath, Other and Unknown     Flushing, respiratory distress    Sulfa (Sulfonamide Antibiotics) Shortness of breath, Other and Unknown     Flushing, respiratory     Bee Venom Protein (Honey Bee) Unknown    Ciprofloxacin Unknown    Codeine Unknown    Doxycycline Unknown    Erythromycin Unknown    Methylchloroisothiazolinone Unknown    Nitrofurantoin Monohyd/M-Cryst Unknown, Myalgia and Nausea/vomiting     "Oxycodone-Acetaminophen Unknown    Prednisone Unknown        Physical Exam   /65 (BP Location: Right arm, Patient Position: Sitting, BP Cuff Size: Adult)   Pulse 57   Temp 35.8 °C (96.5 °F) (Temporal)   Ht 1.702 m (5' 7\")   Wt 82.1 kg (181 lb)   BMI 28.35 kg/m²   General: Well appearing, no acute distress  Heart: Normal rate  Neck: No visible goiter   Lungs: Breathing comfortably on room air   Abdomen: Soft, nontender  Extremities: Warm,no LE edema   Skin: No rashes    Labs and Imaging     Lab Results   Component Value Date    HGBA1C 7.0 (H) 09/20/2024    HGBA1C 6.8 (A) 06/10/2024    HGBA1C 6.9 (A) 04/08/2024     11/14/2024    K 4.2 11/14/2024     11/14/2024    CO2 29 11/14/2024    BUN 19 11/14/2024    CREATININE 1.00 11/14/2024    CALCIUM 8.9 11/14/2024    ALBUMIN 3.9 11/14/2024    PROT 6.1 (L) 11/14/2024    BILITOT 0.5 11/14/2024    ALKPHOS 49 11/14/2024    ALT 12 11/14/2024    AST 16 11/14/2024    GLUCOSE 126 (H) 11/14/2024    CHOL 101 11/14/2024    TRIG 93 11/14/2024    HDL 57.9 11/14/2024   LDL 25    Component      Latest Ref Rng 11/15/2024   Albumin, Urine Random      Not established mg/L 43.8    Creatinine, Urine Random      20.0 - 320.0 mg/dL 62.6    Albumin/Creatinine Ratio      <30.0 ug/mg Creat 70.0 (H)        Assessment and Plan   Madalyn Chavez is a 79 y.o. year old female with medical history of T2D, HTN, DLD, here for diabetes. BG control is at goal (A1c 7.0%), but discussed replacing repaglinide with an agent that does not have a risk of hypoglycemia - specifically SGLT2i or GLP1RA. Given her frequent UTIs, will not trial SGLT2i now. She is agreeable to trying GLP1RA.     # T2D:   - Continue metformin 1000 mg twice daily   - Stop repaglinide   - Start Trulicity 0.75 mg once weekly - possible side effects discussed.   - Lipids: LDL at goal, on simvastatin   - Urine Alb/Cr: 70.0 mcg/mg - repeat ordered   - Eye exam: Due 8/2025 for dilated exam     RTC: 3-4 months. If not " tolerating Trulicity, she will let me know via MyChart     Maricruz Vaughn, DO   Endocrinology

## 2025-02-11 ENCOUNTER — APPOINTMENT (OUTPATIENT)
Dept: ENDOCRINOLOGY | Facility: CLINIC | Age: 80
End: 2025-02-11
Payer: MEDICARE

## 2025-02-11 VITALS
WEIGHT: 181 LBS | SYSTOLIC BLOOD PRESSURE: 121 MMHG | HEIGHT: 67 IN | DIASTOLIC BLOOD PRESSURE: 65 MMHG | HEART RATE: 57 BPM | BODY MASS INDEX: 28.41 KG/M2 | TEMPERATURE: 96.5 F

## 2025-02-11 DIAGNOSIS — E11.65 TYPE 2 DIABETES MELLITUS WITH HYPERGLYCEMIA, WITHOUT LONG-TERM CURRENT USE OF INSULIN: ICD-10-CM

## 2025-02-11 DIAGNOSIS — E11.9 TYPE 2 DIABETES MELLITUS WITHOUT COMPLICATION, WITHOUT LONG-TERM CURRENT USE OF INSULIN (MULTI): ICD-10-CM

## 2025-02-11 LAB — POC HEMOGLOBIN A1C: 7 % (ref 4.2–6.5)

## 2025-02-11 PROCEDURE — G2211 COMPLEX E/M VISIT ADD ON: HCPCS | Performed by: STUDENT IN AN ORGANIZED HEALTH CARE EDUCATION/TRAINING PROGRAM

## 2025-02-11 PROCEDURE — 1159F MED LIST DOCD IN RCRD: CPT | Performed by: STUDENT IN AN ORGANIZED HEALTH CARE EDUCATION/TRAINING PROGRAM

## 2025-02-11 PROCEDURE — 83036 HEMOGLOBIN GLYCOSYLATED A1C: CPT | Performed by: STUDENT IN AN ORGANIZED HEALTH CARE EDUCATION/TRAINING PROGRAM

## 2025-02-11 PROCEDURE — 1123F ACP DISCUSS/DSCN MKR DOCD: CPT | Performed by: STUDENT IN AN ORGANIZED HEALTH CARE EDUCATION/TRAINING PROGRAM

## 2025-02-11 PROCEDURE — 3078F DIAST BP <80 MM HG: CPT | Performed by: STUDENT IN AN ORGANIZED HEALTH CARE EDUCATION/TRAINING PROGRAM

## 2025-02-11 PROCEDURE — 1036F TOBACCO NON-USER: CPT | Performed by: STUDENT IN AN ORGANIZED HEALTH CARE EDUCATION/TRAINING PROGRAM

## 2025-02-11 PROCEDURE — 99214 OFFICE O/P EST MOD 30 MIN: CPT | Performed by: STUDENT IN AN ORGANIZED HEALTH CARE EDUCATION/TRAINING PROGRAM

## 2025-02-11 PROCEDURE — 3074F SYST BP LT 130 MM HG: CPT | Performed by: STUDENT IN AN ORGANIZED HEALTH CARE EDUCATION/TRAINING PROGRAM

## 2025-02-11 RX ORDER — LANCETS
EACH MISCELLANEOUS
Qty: 200 EACH | Refills: 11 | Status: SHIPPED | OUTPATIENT
Start: 2025-02-11

## 2025-02-11 RX ORDER — BLOOD-GLUCOSE METER
EACH MISCELLANEOUS
Qty: 100 EACH | Refills: 11 | Status: SHIPPED | OUTPATIENT
Start: 2025-02-11

## 2025-02-11 ASSESSMENT — ENCOUNTER SYMPTOMS
DEPRESSION: 0
OCCASIONAL FEELINGS OF UNSTEADINESS: 1
LOSS OF SENSATION IN FEET: 0

## 2025-02-11 ASSESSMENT — PATIENT HEALTH QUESTIONNAIRE - PHQ9
1. LITTLE INTEREST OR PLEASURE IN DOING THINGS: NOT AT ALL
2. FEELING DOWN, DEPRESSED OR HOPELESS: NOT AT ALL
SUM OF ALL RESPONSES TO PHQ9 QUESTIONS 1 AND 2: 0

## 2025-02-11 NOTE — PATIENT INSTRUCTIONS
- Continue metformin   - Start Trulicity 0.75 mg once weekly   - When you start this, stop repaglinide   - When you get blood work next time, please give a urine sample to check for protein in the urine

## 2025-02-16 ASSESSMENT — EXTERNAL EXAM - RIGHT EYE: OD_EXAM: NORMAL

## 2025-02-16 ASSESSMENT — EXTERNAL EXAM - LEFT EYE: OS_EXAM: NORMAL

## 2025-02-16 ASSESSMENT — CUP TO DISC RATIO
OS_RATIO: .45
OD_RATIO: .45

## 2025-02-16 NOTE — PROGRESS NOTES
History of YAG laser capsulotomy, left eye  PCO (posterior capsular opacification), [id number]  PCO (posterior capsular opacification), leftH26.492  Pseudophakia of both eyesZ96.1  -History of cataract surgery OU in 2012  -S/p YAG laser capsulotomy OS 11/19/24  -Visually significant PCO right eye. Symptoms: Gradual decrease in vision over the past 1-2 years. Harder to read small print. More difficulty seeing small words/numbers on TV. Having more trouble seeing road signs when driving at night. Glare from headlights when driving at night.   Discussed diagnosis with patient and option of YAG capsulotomy. Discussed procedure. Discussed potential risks, benefits, and alternatives, including but not limited to: pain, inflammation, edema, retinal tear/detachment, floaters, increased eye pressure, damage to other ocular structures, damage to/dislocation of lens implant, glare, need to repeat procedure, loss of vision or loss of eye. Patient understands and wishes to proceed. Consent signed.  YAG capsulotomy right eye done 2/18/25. Advised to use artificial tears PRN. F/u 4-8 weeks - refract OU (autorefract first), glare/BAT OD, dilate OU - Plan is for postop check OD and possible need to enlarge OD or OS depending on visual recovery. D/w patient may need to repeat laser to enlarge opening in the future as needed. Call or return sooner if any redness, pain, decreased vision, flashes, or floaters.   Pulse = 68  TE = 68  Power = 1.0 mJ    Diabetes [id number]  -HbA1c= 7.0 (9/20/24). No diabetic retinopathy seen on exam. Continue close monitoring of blood glucose, blood pressure, and cholesterol. Plan for annual dilated eye exam.    Dry eyes, [id number]  -Feels that vision gets blurry as day goes on or after reading for some time  -Use warm compresses 2 times a day  -Use artificial tears 2-4 times a day and PRN    OhcvpxdcaC14.00  BpxltktwiyE44.4  -Continue current glasses for now.   -Refraction performed  today for diagnostic purposes only and without specific intent to dispense Rx. Glasses Rx not dispensed today.   -F/u 4-8 weeks - refract OU (autorefract first), glare/BAT OD, dilate OU - Plan is for postop check OD and possible need to enlarge OD or OS depending on visual recovery.       No history of refractive surgery.   No FH of AMD/glaucoma

## 2025-02-18 ENCOUNTER — APPOINTMENT (OUTPATIENT)
Dept: OPHTHALMOLOGY | Facility: CLINIC | Age: 80
End: 2025-02-18
Payer: MEDICARE

## 2025-02-18 DIAGNOSIS — H04.123 DRY EYES, BILATERAL: ICD-10-CM

## 2025-02-18 DIAGNOSIS — H52.03 HYPEROPIA, BILATERAL: ICD-10-CM

## 2025-02-18 DIAGNOSIS — Z96.1 PSEUDOPHAKIA: ICD-10-CM

## 2025-02-18 DIAGNOSIS — E11.9 DIABETES MELLITUS WITHOUT COMPLICATION (MULTI): ICD-10-CM

## 2025-02-18 DIAGNOSIS — H26.492 PCO (POSTERIOR CAPSULAR OPACIFICATION), LEFT: Primary | ICD-10-CM

## 2025-02-18 DIAGNOSIS — H26.491 PCO (POSTERIOR CAPSULAR OPACIFICATION), RIGHT: ICD-10-CM

## 2025-02-18 DIAGNOSIS — H52.4 PRESBYOPIA: ICD-10-CM

## 2025-02-18 PROCEDURE — 99214 OFFICE O/P EST MOD 30 MIN: CPT | Performed by: OPHTHALMOLOGY

## 2025-02-18 PROCEDURE — 66821 AFTER CATARACT LASER SURGERY: CPT | Mod: RIGHT SIDE | Performed by: OPHTHALMOLOGY

## 2025-02-18 ASSESSMENT — ENCOUNTER SYMPTOMS
NEUROLOGICAL NEGATIVE: 0
CONSTITUTIONAL NEGATIVE: 0
ENDOCRINE NEGATIVE: 0
HEMATOLOGIC/LYMPHATIC NEGATIVE: 0
GASTROINTESTINAL NEGATIVE: 0
EYES NEGATIVE: 1
MUSCULOSKELETAL NEGATIVE: 0
RESPIRATORY NEGATIVE: 0
PSYCHIATRIC NEGATIVE: 0
ALLERGIC/IMMUNOLOGIC NEGATIVE: 0
CARDIOVASCULAR NEGATIVE: 0

## 2025-02-18 ASSESSMENT — REFRACTION_MANIFEST
OS_AXIS: 045
OS_CYLINDER: -0.50
OS_SPHERE: +1.50
OD_ADD: +2.50
OS_ADD: +2.50
OD_CYLINDER: SPHER
OD_SPHERE: +1.25

## 2025-02-18 ASSESSMENT — VISUAL ACUITY
OD_CC: 20/40
CORRECTION_TYPE: GLASSES
OD_BAT_MED: 20/70
METHOD: SNELLEN - LINEAR
OS_BAT_MED: 20/30
OS_CC: 20/25-

## 2025-02-18 ASSESSMENT — REFRACTION_WEARINGRX
OS_AXIS: 045
OS_SPHERE: +1.50
OD_ADD: +1.00
OS_CYLINDER: -0.50
OD_SPHERE: +1.25
OS_ADD: +1.00
SPECS_TYPE: BIFOCAL
OD_CYLINDER: SPHER

## 2025-02-18 ASSESSMENT — TONOMETRY
OD_IOP_MMHG: 14
IOP_METHOD: GOLDMANN APPLANATION
OS_IOP_MMHG: 14

## 2025-03-03 DIAGNOSIS — E11.9 TYPE 2 DIABETES MELLITUS WITHOUT COMPLICATION, WITHOUT LONG-TERM CURRENT USE OF INSULIN (MULTI): ICD-10-CM

## 2025-03-03 RX ORDER — METFORMIN HYDROCHLORIDE 500 MG/1
1000 TABLET ORAL
Qty: 360 TABLET | Refills: 1 | Status: SHIPPED | OUTPATIENT
Start: 2025-03-03

## 2025-03-12 ENCOUNTER — TELEPHONE (OUTPATIENT)
Dept: OBSTETRICS AND GYNECOLOGY | Facility: CLINIC | Age: 80
End: 2025-03-12
Payer: MEDICARE

## 2025-03-12 DIAGNOSIS — K21.00 GASTROESOPHAGEAL REFLUX DISEASE WITH ESOPHAGITIS WITHOUT HEMORRHAGE: ICD-10-CM

## 2025-03-12 DIAGNOSIS — N39.41 URGE URINARY INCONTINENCE: ICD-10-CM

## 2025-03-12 DIAGNOSIS — I10 PRIMARY HYPERTENSION: ICD-10-CM

## 2025-03-12 DIAGNOSIS — E78.49 OTHER HYPERLIPIDEMIA: ICD-10-CM

## 2025-03-12 RX ORDER — OMEPRAZOLE 40 MG/1
CAPSULE, DELAYED RELEASE ORAL
Qty: 90 CAPSULE | Refills: 2 | Status: SHIPPED | OUTPATIENT
Start: 2025-03-12

## 2025-03-12 RX ORDER — LISINOPRIL 10 MG/1
10 TABLET ORAL DAILY
Qty: 90 TABLET | Refills: 2 | Status: SHIPPED | OUTPATIENT
Start: 2025-03-12

## 2025-03-12 RX ORDER — FENOFIBRATE 48 MG/1
48 TABLET, FILM COATED ORAL DAILY
Qty: 90 TABLET | Refills: 2 | Status: SHIPPED | OUTPATIENT
Start: 2025-03-12

## 2025-03-12 NOTE — TELEPHONE ENCOUNTER
Request refill of Myrbetriq 50 mg tablet, extended release. Send to [facility] in [place].

## 2025-03-13 RX ORDER — MIRABEGRON 50 MG/1
50 TABLET, FILM COATED, EXTENDED RELEASE ORAL DAILY
Qty: 90 TABLET | Refills: 3 | Status: SHIPPED | OUTPATIENT
Start: 2025-03-13

## 2025-03-20 ENCOUNTER — TELEPHONE (OUTPATIENT)
Dept: OBSTETRICS AND GYNECOLOGY | Facility: CLINIC | Age: 80
End: 2025-03-20
Payer: MEDICARE

## 2025-03-20 NOTE — TELEPHONE ENCOUNTER
Alex from [facility] called and would like a patient med change to a generic brand due to pt not having insurance. The med prescribed was Myrbetriq

## 2025-03-21 DIAGNOSIS — N39.41 URGE URINARY INCONTINENCE: Primary | ICD-10-CM

## 2025-03-21 RX ORDER — MIRABEGRON 50 MG/1
50 TABLET, FILM COATED, EXTENDED RELEASE ORAL DAILY
Qty: 90 TABLET | Refills: 3 | Status: SHIPPED | OUTPATIENT
Start: 2025-03-21 | End: 2026-03-21

## 2025-04-13 ASSESSMENT — EXTERNAL EXAM - RIGHT EYE: OD_EXAM: NORMAL

## 2025-04-13 ASSESSMENT — CUP TO DISC RATIO
OS_RATIO: .45
OD_RATIO: .45

## 2025-04-13 ASSESSMENT — EXTERNAL EXAM - LEFT EYE: OS_EXAM: NORMAL

## 2025-04-13 NOTE — PROGRESS NOTES
History of YAG laser capsulotomy, left eye  History of YAG laser capsulotomy, right eye  PCO (posterior capsular opacification), [id number]  PCO (posterior capsular opacification), leftH26.492  Pseudophakia of both eyesZ96.1  -History of cataract surgery OU in 2012  -S/p YAG laser capsulotomy OS 11/19/24 - vision improved   -S/p YAG laser capsulotomy OD 2/18/25 - vision improved  -Capsular opening OS on smaller size, but vision improved. D/w patient option of repeat YAG laser capsulotomy OS vs monitor and can consider repeat YAG laser capsulotomy in the future as needed.   -F/u 6-8 months - refract OU, glare/BAT OS, dilate OU.     Diabetes beefdqlbU39.9  -HbA1c= 7.0 (9/20/24). No diabetic retinopathy seen on exam. Continue close monitoring of blood glucose, blood pressure, and cholesterol. Plan for annual dilated eye exam.    Dry eyes, havqbcyzzC63.123  -Feels that vision gets blurry as day goes on or after reading for some time  -Use warm compresses 2 times a day  -Use artificial tears 2-4 times a day and PRN    OochyuohcA76.00  WppolfxcxxB38.4  -New Rx given per patient request.       No history of refractive surgery.   No FH of AMD/glaucoma

## 2025-04-14 DIAGNOSIS — E11.9 TYPE 2 DIABETES MELLITUS WITHOUT COMPLICATION, WITHOUT LONG-TERM CURRENT USE OF INSULIN: ICD-10-CM

## 2025-04-14 RX ORDER — METFORMIN HYDROCHLORIDE 500 MG/1
1000 TABLET ORAL
Qty: 360 TABLET | Refills: 2 | Status: SHIPPED | OUTPATIENT
Start: 2025-04-14

## 2025-04-15 ENCOUNTER — APPOINTMENT (OUTPATIENT)
Dept: OPHTHALMOLOGY | Facility: CLINIC | Age: 80
End: 2025-04-15
Payer: MEDICARE

## 2025-04-15 DIAGNOSIS — H26.491 PCO (POSTERIOR CAPSULAR OPACIFICATION), RIGHT: ICD-10-CM

## 2025-04-15 DIAGNOSIS — H04.123 DRY EYES, BILATERAL: ICD-10-CM

## 2025-04-15 DIAGNOSIS — H26.492 PCO (POSTERIOR CAPSULAR OPACIFICATION), LEFT: ICD-10-CM

## 2025-04-15 DIAGNOSIS — H52.4 PRESBYOPIA: ICD-10-CM

## 2025-04-15 DIAGNOSIS — H52.03 HYPEROPIA, BILATERAL: ICD-10-CM

## 2025-04-15 DIAGNOSIS — E11.9 DIABETES MELLITUS WITHOUT COMPLICATION: ICD-10-CM

## 2025-04-15 DIAGNOSIS — Z98.49 HISTORY OF YAG LASER CAPSULOTOMY OF LENS, UNSPECIFIED LATERALITY: Primary | ICD-10-CM

## 2025-04-15 DIAGNOSIS — Z96.1 PSEUDOPHAKIA: ICD-10-CM

## 2025-04-15 PROCEDURE — 99024 POSTOP FOLLOW-UP VISIT: CPT | Performed by: OPHTHALMOLOGY

## 2025-04-15 RX ORDER — FESOTERODINE FUMARATE 8 MG/1
TABLET, FILM COATED, EXTENDED RELEASE ORAL
COMMUNITY
Start: 2025-03-20

## 2025-04-15 ASSESSMENT — REFRACTION_MANIFEST
OD_AXIS: 110
METHOD_AUTOREFRACTION: 1
OS_CYLINDER: -0.75
OD_SPHERE: +1.25
OD_CYLINDER: SPHER
OS_AXIS: 055
OS_SPHERE: +2.00
OD_ADD: +2.50
OS_SPHERE: +1.75
OD_SPHERE: +1.25
OD_CYLINDER: -0.50
OS_CYLINDER: -1.25
OS_AXIS: 050
OS_ADD: +2.50

## 2025-04-15 ASSESSMENT — ENCOUNTER SYMPTOMS
NEUROLOGICAL NEGATIVE: 0
CONSTITUTIONAL NEGATIVE: 0
ALLERGIC/IMMUNOLOGIC NEGATIVE: 0
GASTROINTESTINAL NEGATIVE: 0
HEMATOLOGIC/LYMPHATIC NEGATIVE: 0
CARDIOVASCULAR NEGATIVE: 0
RESPIRATORY NEGATIVE: 0
EYES NEGATIVE: 1
MUSCULOSKELETAL NEGATIVE: 0
ENDOCRINE NEGATIVE: 0
PSYCHIATRIC NEGATIVE: 0

## 2025-04-15 ASSESSMENT — VISUAL ACUITY
METHOD: SNELLEN - LINEAR
OS_CC: 20/25-
OD_CC: 20/20-

## 2025-04-15 ASSESSMENT — REFRACTION_WEARINGRX
OS_CYLINDER: -0.50
SPECS_TYPE: BIFOCAL
OD_CYLINDER: SPHER
OS_AXIS: 045
OS_ADD: +1.00
OD_SPHERE: +1.25
OD_ADD: +1.00
OS_SPHERE: +1.50

## 2025-04-15 ASSESSMENT — TONOMETRY
OD_IOP_MMHG: 14
OS_IOP_MMHG: 13
IOP_METHOD: GOLDMANN APPLANATION

## 2025-05-09 DIAGNOSIS — E11.9 TYPE 2 DIABETES MELLITUS WITHOUT COMPLICATION, WITHOUT LONG-TERM CURRENT USE OF INSULIN: ICD-10-CM

## 2025-05-12 RX ORDER — REPAGLINIDE 1 MG/1
1 TABLET ORAL 3 TIMES DAILY
Qty: 90 TABLET | Refills: 0 | Status: SHIPPED | OUTPATIENT
Start: 2025-05-12

## 2025-05-13 ENCOUNTER — APPOINTMENT (OUTPATIENT)
Dept: PRIMARY CARE | Facility: CLINIC | Age: 80
End: 2025-05-13
Payer: MEDICARE

## 2025-05-16 ENCOUNTER — TELEPHONE (OUTPATIENT)
Dept: OBSTETRICS AND GYNECOLOGY | Facility: CLINIC | Age: 80
End: 2025-05-16
Payer: MEDICARE

## 2025-05-21 NOTE — PROGRESS NOTES
HPI:  Pt here for intradetrusor Botox injection    3/2024 100 U  180 U 9/2023 12/2024 180 U     The patient gave a urine sample which was checked for infection and found to be negative.  Pt was numbed for 20 min with lidojet inserted in to the bladder and given 100 mg po pyridium.    The patient was consented for cytoscopic intradetrusor Botox injection.        Units was reconstituted in     mL of NS  Using a flexible scope injection was performed at 4 sites using the Laborie needle at a 3 mm depth starting in the midline just above the intraureteric ridge and then to the left and right of this site and then just above until the entire drug volume was injected followed by 1 mL NS flush.    No bleeding was noted    The procedure was completed, the patient allowed to empty her bladder and get dressed.    Post-procedure precautions were given to the patient and macrobid 100 mg po BID x 3 days script sent.    She will follow-up with Kalyani Barboza in 1 month.    STM

## 2025-05-22 ENCOUNTER — PROCEDURE VISIT (OUTPATIENT)
Dept: OBSTETRICS AND GYNECOLOGY | Facility: CLINIC | Age: 80
End: 2025-05-22
Payer: MEDICARE

## 2025-05-22 VITALS
HEIGHT: 67 IN | HEART RATE: 79 BPM | SYSTOLIC BLOOD PRESSURE: 144 MMHG | WEIGHT: 179 LBS | BODY MASS INDEX: 28.09 KG/M2 | DIASTOLIC BLOOD PRESSURE: 81 MMHG

## 2025-05-22 DIAGNOSIS — N39.0 URINARY TRACT INFECTION WITH HEMATURIA, SITE UNSPECIFIED: ICD-10-CM

## 2025-05-22 DIAGNOSIS — N39.0 RECURRENT UTI: Primary | ICD-10-CM

## 2025-05-22 DIAGNOSIS — R31.9 URINARY TRACT INFECTION WITH HEMATURIA, SITE UNSPECIFIED: ICD-10-CM

## 2025-05-22 DIAGNOSIS — N39.41 URGE INCONTINENCE: ICD-10-CM

## 2025-05-22 LAB
POC APPEARANCE, URINE: ABNORMAL
POC BILIRUBIN, URINE: NEGATIVE
POC BLOOD, URINE: ABNORMAL
POC COLOR, URINE: YELLOW
POC GLUCOSE, URINE: NEGATIVE MG/DL
POC KETONES, URINE: NEGATIVE MG/DL
POC LEUKOCYTES, URINE: ABNORMAL
POC NITRITE,URINE: POSITIVE
POC PH, URINE: 5.5 PH
POC PROTEIN, URINE: NEGATIVE MG/DL
POC SPECIFIC GRAVITY, URINE: 1.01
POC UROBILINOGEN, URINE: 0.2 EU/DL

## 2025-05-22 PROCEDURE — 81003 URINALYSIS AUTO W/O SCOPE: CPT | Performed by: OBSTETRICS & GYNECOLOGY

## 2025-05-22 RX ORDER — LIDOCAINE HYDROCHLORIDE 20 MG/ML
1 JELLY TOPICAL ONCE
Status: SHIPPED | OUTPATIENT
Start: 2025-05-22

## 2025-05-22 RX ORDER — GRANULES FOR ORAL 3 G/1
3 POWDER ORAL ONCE
Qty: 6 G | Refills: 1 | Status: SHIPPED | OUTPATIENT
Start: 2025-05-22 | End: 2025-05-22

## 2025-05-22 RX ORDER — LIDOCAINE HYDROCHLORIDE 10 MG/ML
5 INJECTION, SOLUTION INFILTRATION; PERINEURAL ONCE
Status: SHIPPED | OUTPATIENT
Start: 2025-05-22

## 2025-05-22 RX ORDER — SODIUM CHLORIDE 9 MG/ML
10 INJECTION, SOLUTION INTRAMUSCULAR; INTRAVENOUS; SUBCUTANEOUS ONCE
Status: SHIPPED | OUTPATIENT
Start: 2025-05-22

## 2025-05-22 ASSESSMENT — PAIN SCALES - GENERAL: PAINLEVEL_OUTOF10: 0-NO PAIN

## 2025-05-25 LAB — BACTERIA UR CULT: ABNORMAL

## 2025-05-26 DIAGNOSIS — N39.0 ACUTE LOWER UTI: Primary | ICD-10-CM

## 2025-05-26 RX ORDER — GRANULES FOR ORAL 3 G/1
3 POWDER ORAL ONCE
Qty: 6 G | Refills: 0 | Status: SHIPPED | OUTPATIENT
Start: 2025-05-26 | End: 2025-05-26

## 2025-05-29 ENCOUNTER — APPOINTMENT (OUTPATIENT)
Dept: OBSTETRICS AND GYNECOLOGY | Facility: CLINIC | Age: 80
End: 2025-05-29
Payer: MEDICARE

## 2025-06-04 ENCOUNTER — APPOINTMENT (OUTPATIENT)
Dept: SURGERY | Facility: CLINIC | Age: 80
End: 2025-06-04
Payer: MEDICARE

## 2025-06-11 DIAGNOSIS — E11.9 TYPE 2 DIABETES MELLITUS WITHOUT COMPLICATION, WITHOUT LONG-TERM CURRENT USE OF INSULIN: ICD-10-CM

## 2025-06-12 RX ORDER — REPAGLINIDE 1 MG/1
1 TABLET ORAL 3 TIMES DAILY
Qty: 90 TABLET | Refills: 0 | Status: SHIPPED | OUTPATIENT
Start: 2025-06-12

## 2025-06-18 DIAGNOSIS — F32.A DEPRESSION, UNSPECIFIED DEPRESSION TYPE: ICD-10-CM

## 2025-06-18 NOTE — PROGRESS NOTES
Endocrinology  6/19/2025    History of Present Illness   Madalyn Chavez is a 79 y.o. year old female with medical history of T2D, HTN, DLD, Meniere's disease, here for diabetes.     LV: 2/11/2025  Feeling well today.      doing better from heart perspective.     Current regimen:   - Metformin 1000 mg twice daily   - Replaglinide 1 mg. Taking this on average once daily with breakfast. Not usually eating lunch, and often skipping this medication with dinner.     She lost prescription coverage, and never started Trulicity due to cost. This was all connected to a credit card hack. Told her coverage was canceled.    Planning to re-enroll in October.     SMBG: Finger sticks, infrequent      Hypoglycemia: Denies   Hypoglycemia awareness: Yes    Weight changes: Weight is stable   Denies any increased thirst, urination, or blurry vision.     Diet:   - Eats 2 meals a day. Sometimes skips lunch. Has late breakfast   - 1 can of pepsi a day     DM history:   Year/age at diagnosis: Unsure - ~20 years ago    Prior medications: Denies   Previous hospitalizations for hyperglycemia: Denies   Complications: Denies     Last eye exam: 4/2025 - no retinopathy   Last foot exam: Podiatry every 3-4 months      No history of pancreatitis   No family history of MTC   She reports several UTIs this year - follows with urology.     Medications     Current Outpatient Medications   Medication Instructions    albuterol (ProAir HFA) 90 mcg/actuation inhaler Inhale.    aspirin 81 mg EC tablet 1 tablet, Daily    blood sugar diagnostic (OneTouch Verio test strips) strip Use to check blood glucose 3 times daily    cholecalciferol (Vitamin D-3) 50 MCG (2000 UT) tablet Take by mouth.    diazePAM (VALIUM) 5 mg, oral, Once    diclofenac sodium (VOLTAREN) 4 g, 2 times daily PRN    dulaglutide (TRULICITY) 0.75 mg, subcutaneous, Weekly    fenofibrate (TRICOR) 48 mg, oral, Daily    fesoterodine 8 mg tablet extended release 24 hr TAKE 1 TABLET BY  MOUTH EARLY IN THE MORNING    fluticasone (Flonase) 50 mcg/actuation nasal spray 1 spray, Each Nostril, Daily, Shake gently. Before first use, prime pump. After use, clean tip and replace cap.    FreeStyle glucose monitoring kit 1 each, As needed    guaiFENesin (MUCINEX) 1,200 mg, oral, 2 times daily, Do not crush, chew, or split.    hydroCHLOROthiazide (Microzide) 12.5 mg capsule Take by mouth.    hydroCHLOROthiazide (MICROZIDE) 12.5 mg, oral, Daily    lancets (OneTouch UltraSoft Lancets) misc Use to check blood glucose 3 times daily    lisinopril 10 mg, oral, Daily    meloxicam (MOBIC) 15 mg, oral, Daily    metFORMIN (GLUCOPHAGE) 1,000 mg, oral, 2 times daily (morning and late afternoon)    mirabegron (MYRBETRIQ) 50 mg, oral, Daily    omeprazole (PriLOSEC) 40 mg DR capsule TAKE 1 CAPSULE DAILY BEFOREA MEAL. PLEASE MAKE AN     APPOINTMENT WITH YOUR      DOCTOR    OneTouch Ultra Test strip 102 strips, miscellaneous, 2 times daily    PARoxetine (PAXIL) 40 mg, oral, Daily    repaglinide (PRANDIN) 1 mg, oral, 3 times daily    simvastatin (Zocor) 20 mg tablet TAKE 1 TABLET AT BEDTIME    triamcinolone (Kenalog) 0.025 % cream Apply twice daily to affected areas of hands (avoid face, groin, body folds) for 2 weeks, then taper to twice daily on weekends only; repeat every 6-8 weeks as needed for flares        History     Past Medical History:   Diagnosis Date    Cataract     Diabetes mellitus (Multi)     Dry eyes     Dysphagia, unspecified 12/05/2013    Dysphagia    Encounter for screening mammogram for malignant neoplasm of breast     Visit for screening mammogram    Localized edema 08/11/2016    Edema extremities    Other specified complication of genitourinary prosthetic devices, implants and grafts, initial encounter 08/27/2018    Vaginal erosion secondary to pessary use, initial encounter    Personal history of diseases of the skin and subcutaneous tissue 09/05/2013    History of contact dermatitis    Personal history  of other diseases of the circulatory system 10/31/2014    History of hypertension    Personal history of other diseases of the nervous system and sense organs     History of cataract    Personal history of other diseases of urinary system     History of bladder problems    Personal history of other mental and behavioral disorders 08/09/2013    History of anxiety disorder       Past Surgical History:   Procedure Laterality Date    CAPSULOTOMY Left 11/19/2024    Dr. Pang    CAPSULOTOMY Right 02/18/2025    Dr. Pang    CATARACT EXTRACTION W/  INTRAOCULAR LENS IMPLANT Bilateral     2012    COLONOSCOPY  03/06/2013    Complete Colonoscopy    DILATION AND CURETTAGE OF UTERUS  03/06/2013    Dilation And Curettage    DILATION AND CURETTAGE OF UTERUS  09/16/2016    Dilation And Curettage    HERNIA REPAIR  03/06/2013    Hernia Repair    HERNIA REPAIR  09/16/2016    Hernia Repair    OTHER SURGICAL HISTORY  08/30/2022    Cataract surgery    TONSILLECTOMY  10/31/2014    Tonsillectomy       Family History   Problem Relation Name Age of Onset    Hypertension Mother      Other (stroke syndrome) Mother      Other (cardiovascular disorder) Father      Coronary artery disease Brother          Allergies   Allergen Reactions    Penicillins Shortness of breath, Other and Unknown     Flushing, respiratory distress    Sulfa (Sulfonamide Antibiotics) Shortness of breath, Other and Unknown     Flushing, respiratory     Bee Venom Protein (Honey Bee) Unknown    Ciprofloxacin Unknown    Codeine Unknown    Doxycycline Unknown    Erythromycin Unknown    Methylchloroisothiazolinone Unknown    Nitrofurantoin Monohyd/M-Cryst Unknown, Myalgia and Nausea/vomiting    Oxycodone-Acetaminophen Unknown    Prednisone Unknown        Physical Exam   There were no vitals taken for this visit.  General: Well appearing, no acute distress  Heart: Normal rate  Neck: No visible goiter   Lungs: Breathing comfortably on room air   Abdomen: Soft,  nontender  Extremities: Warm,no LE edema   Skin: No rashes    Labs and Imaging     Lab Results   Component Value Date    HGBA1C 7.0 (A) 02/11/2025    HGBA1C 7.0 (H) 09/20/2024    HGBA1C 6.8 (A) 06/10/2024     11/14/2024    K 4.2 11/14/2024     11/14/2024    CO2 29 11/14/2024    BUN 19 11/14/2024    CREATININE 1.00 11/14/2024    CALCIUM 8.9 11/14/2024    ALBUMIN 3.9 11/14/2024    PROT 6.1 (L) 11/14/2024    BILITOT 0.5 11/14/2024    ALKPHOS 49 11/14/2024    ALT 12 11/14/2024    AST 16 11/14/2024    GLUCOSE 126 (H) 11/14/2024    CHOL 101 11/14/2024    TRIG 93 11/14/2024    HDL 57.9 11/14/2024   LDL 25    Component      Latest Ref Rng 11/15/2024   Albumin, Urine Random      Not established mg/L 43.8    Creatinine, Urine Random      20.0 - 320.0 mg/dL 62.6    Albumin/Creatinine Ratio      <30.0 ug/mg Creat 70.0 (H)      Meter: Checking right after meals      Media Information        Assessment and Plan   Madalyn Chavez is a 79 y.o. year old female with medical history of T2D, HTN, DLD, here for diabetes. BG control is at goal (A1c 6.5%). In the past, have discussed replacing repaglinide with an agent that does not have a risk of hypoglycemia - specifically SGLT2i or GLP1RA. Given her frequent UTIs, decided not to trial SGLT2i. While she was agreeable to trying GLP1RA, she has been unable to get this due to personal financial issues. She is on minimal repaglinide (taking this once daily) with A1c well below goal (6.5%). Reviewed hypoglycemia symptoms and if she has any, recommend just stopping repaglinide.     # T2D:   - Continue metformin 1000 mg twice daily   - Continue repaglinide 1 mg once daily   - Lipids: LDL at goal, on simvastatin   - Urine Alb/Cr: 70.0 mcg/mg - repeat ordered   - Eye exam: Due 4/2026    RTC: 6 months     Maricruz Vaughn, DO   Endocrinology

## 2025-06-19 ENCOUNTER — APPOINTMENT (OUTPATIENT)
Dept: ENDOCRINOLOGY | Facility: CLINIC | Age: 80
End: 2025-06-19
Payer: MEDICARE

## 2025-06-19 VITALS
BODY MASS INDEX: 28.25 KG/M2 | HEART RATE: 86 BPM | WEIGHT: 180 LBS | DIASTOLIC BLOOD PRESSURE: 78 MMHG | SYSTOLIC BLOOD PRESSURE: 124 MMHG | TEMPERATURE: 97.4 F | HEIGHT: 67 IN

## 2025-06-19 DIAGNOSIS — E11.65 TYPE 2 DIABETES MELLITUS WITH HYPERGLYCEMIA, WITHOUT LONG-TERM CURRENT USE OF INSULIN: ICD-10-CM

## 2025-06-19 LAB — POC HEMOGLOBIN A1C: 6.5 % (ref 4.2–6.5)

## 2025-06-19 PROCEDURE — 3078F DIAST BP <80 MM HG: CPT | Performed by: STUDENT IN AN ORGANIZED HEALTH CARE EDUCATION/TRAINING PROGRAM

## 2025-06-19 PROCEDURE — 1036F TOBACCO NON-USER: CPT | Performed by: STUDENT IN AN ORGANIZED HEALTH CARE EDUCATION/TRAINING PROGRAM

## 2025-06-19 PROCEDURE — 99214 OFFICE O/P EST MOD 30 MIN: CPT | Performed by: STUDENT IN AN ORGANIZED HEALTH CARE EDUCATION/TRAINING PROGRAM

## 2025-06-19 PROCEDURE — G2211 COMPLEX E/M VISIT ADD ON: HCPCS | Performed by: STUDENT IN AN ORGANIZED HEALTH CARE EDUCATION/TRAINING PROGRAM

## 2025-06-19 PROCEDURE — 83036 HEMOGLOBIN GLYCOSYLATED A1C: CPT | Performed by: STUDENT IN AN ORGANIZED HEALTH CARE EDUCATION/TRAINING PROGRAM

## 2025-06-19 PROCEDURE — 3074F SYST BP LT 130 MM HG: CPT | Performed by: STUDENT IN AN ORGANIZED HEALTH CARE EDUCATION/TRAINING PROGRAM

## 2025-06-19 PROCEDURE — 1159F MED LIST DOCD IN RCRD: CPT | Performed by: STUDENT IN AN ORGANIZED HEALTH CARE EDUCATION/TRAINING PROGRAM

## 2025-06-19 RX ORDER — PAROXETINE 40 MG/1
40 TABLET, FILM COATED ORAL DAILY
Qty: 90 TABLET | Refills: 0 | Status: SHIPPED | OUTPATIENT
Start: 2025-06-19

## 2025-06-19 ASSESSMENT — PATIENT HEALTH QUESTIONNAIRE - PHQ9
SUM OF ALL RESPONSES TO PHQ9 QUESTIONS 1 AND 2: 0
1. LITTLE INTEREST OR PLEASURE IN DOING THINGS: NOT AT ALL
2. FEELING DOWN, DEPRESSED OR HOPELESS: NOT AT ALL

## 2025-06-19 ASSESSMENT — ENCOUNTER SYMPTOMS
DEPRESSION: 0
LOSS OF SENSATION IN FEET: 0
OCCASIONAL FEELINGS OF UNSTEADINESS: 1

## 2025-06-19 NOTE — PATIENT INSTRUCTIONS
- Continue metformin 1000 mg twice daily   - Continue replaglinide 1 mg with breakfast   - If you feel unwell, please check your blood sugar and let me know if you have a sugar less than 80.     Follow up in

## 2025-06-19 NOTE — PROGRESS NOTES
HPI:  Pt here for intradetrusor Botox injection. Plan for 180U.      3/2024 100 U  180 U 9/2023 12/2024 180 U  5/2025 pt was infected so procedure postponed    Her UA was still positive for nitrites today despite taking a dose of fosfomycin 3 days ago. We will send a pathnostics culture. She was very upset about not having her Botox today given her symptom severity. We discussed the possibility of instilling intravesical gentamicin 80 mg prior to the botox injection to treat her UTI. We discussed the risks of pyelonephritis and persistent infection but was willing to proceed.     Pt was numbed for 20 min with lidojet inserted in to the bladder and given 100 mg po pyridium. 80 mg gentamicin was also inserted via st cath at this time.    The patient was consented for cytoscopic intradetrusor Botox injection.     180   Units was reconstituted in   9  mL of NS  Using a flexible scope injection was performed at 4 sites using the Laborie needle at a 3 mm depth starting in the midline just above the intraureteric ridge and then to the left and right of this site and then just above until the entire drug volume was injected followed by 1 mL NS flush.    No bleeding was noted    The procedure was completed, the patient allowed to empty her bladder and get dressed.    Post-procedure precautions were given to the patient and she will take another dose of fosfomycin this evening. We will let her know the PCR urine cx results.    She will follow-up with Kalyani Barboza in 1 month and reinjection in 6 months.    STM   35 minutes total spent with pt and chart and decision making.

## 2025-06-20 ENCOUNTER — TELEPHONE (OUTPATIENT)
Dept: OBSTETRICS AND GYNECOLOGY | Facility: CLINIC | Age: 80
End: 2025-06-20
Payer: MEDICARE

## 2025-06-20 DIAGNOSIS — N39.0 RECURRENT UTI: Primary | ICD-10-CM

## 2025-06-20 RX ORDER — GRANULES FOR ORAL 3 G/1
3 POWDER ORAL ONCE
Qty: 6 G | Refills: 1 | Status: SHIPPED | OUTPATIENT
Start: 2025-06-20 | End: 2025-06-20

## 2025-06-23 ENCOUNTER — PROCEDURE VISIT (OUTPATIENT)
Dept: OBSTETRICS AND GYNECOLOGY | Facility: CLINIC | Age: 80
End: 2025-06-23
Payer: MEDICARE

## 2025-06-23 DIAGNOSIS — N32.81 OVERACTIVE BLADDER: ICD-10-CM

## 2025-06-23 DIAGNOSIS — N39.0 URINARY TRACT INFECTION WITH HEMATURIA, SITE UNSPECIFIED: ICD-10-CM

## 2025-06-23 DIAGNOSIS — R31.9 URINARY TRACT INFECTION WITH HEMATURIA, SITE UNSPECIFIED: ICD-10-CM

## 2025-06-23 PROCEDURE — 99214 OFFICE O/P EST MOD 30 MIN: CPT | Mod: 25 | Performed by: OBSTETRICS & GYNECOLOGY

## 2025-06-23 PROCEDURE — 52287 CYSTOSCOPY CHEMODENERVATION: CPT | Performed by: OBSTETRICS & GYNECOLOGY

## 2025-06-23 PROCEDURE — 2500000004 HC RX 250 GENERAL PHARMACY W/ HCPCS (ALT 636 FOR OP/ED): Mod: TB | Performed by: OBSTETRICS & GYNECOLOGY

## 2025-06-23 PROCEDURE — 51701 INSERT BLADDER CATHETER: CPT | Mod: CCI | Performed by: OBSTETRICS & GYNECOLOGY

## 2025-06-23 RX ORDER — LIDOCAINE HYDROCHLORIDE 10 MG/ML
5 INJECTION, SOLUTION INFILTRATION; PERINEURAL ONCE
Status: SHIPPED | OUTPATIENT
Start: 2025-06-23

## 2025-06-23 RX ORDER — GENTAMICIN 40 MG/ML
2 INJECTION, SOLUTION INTRAMUSCULAR; INTRAVENOUS ONCE
Status: SHIPPED | OUTPATIENT
Start: 2025-06-23

## 2025-06-23 RX ORDER — SODIUM CHLORIDE 9 MG/ML
10 INJECTION, SOLUTION INTRAMUSCULAR; INTRAVENOUS; SUBCUTANEOUS ONCE
Status: SHIPPED | OUTPATIENT
Start: 2025-06-23

## 2025-06-23 RX ORDER — LIDOCAINE HYDROCHLORIDE 20 MG/ML
1 JELLY TOPICAL ONCE
Status: SHIPPED | OUTPATIENT
Start: 2025-06-23

## 2025-06-23 RX ADMIN — ONABOTULINUMTOXINA 180 UNITS: 100 INJECTION, POWDER, LYOPHILIZED, FOR SOLUTION INTRADERMAL; INTRAMUSCULAR at 14:34

## 2025-06-26 RX ORDER — LEVOFLOXACIN 500 MG/1
500 TABLET, FILM COATED ORAL DAILY
Qty: 5 TABLET | Refills: 0 | Status: SHIPPED | OUTPATIENT
Start: 2025-06-26 | End: 2025-07-01

## 2025-07-30 ENCOUNTER — OFFICE VISIT (OUTPATIENT)
Dept: OBSTETRICS AND GYNECOLOGY | Facility: CLINIC | Age: 80
End: 2025-07-30
Payer: MEDICARE

## 2025-07-30 VITALS
WEIGHT: 182.2 LBS | DIASTOLIC BLOOD PRESSURE: 76 MMHG | HEIGHT: 67 IN | HEART RATE: 81 BPM | BODY MASS INDEX: 28.6 KG/M2 | SYSTOLIC BLOOD PRESSURE: 129 MMHG

## 2025-07-30 DIAGNOSIS — R33.9 URINARY RETENTION: Primary | ICD-10-CM

## 2025-07-30 DIAGNOSIS — N39.41 URGE INCONTINENCE: ICD-10-CM

## 2025-07-30 LAB
POC APPEARANCE, URINE: CLEAR
POC BILIRUBIN, URINE: NEGATIVE
POC BLOOD, URINE: NEGATIVE
POC COLOR, URINE: YELLOW
POC GLUCOSE, URINE: NEGATIVE MG/DL
POC KETONES, URINE: NEGATIVE MG/DL
POC LEUKOCYTES, URINE: ABNORMAL
POC NITRITE,URINE: NEGATIVE
POC PH, URINE: 6.5 PH
POC PROTEIN, URINE: NEGATIVE MG/DL
POC SPECIFIC GRAVITY, URINE: 1.02
POC UROBILINOGEN, URINE: 0.2 EU/DL

## 2025-07-30 PROCEDURE — 99213 OFFICE O/P EST LOW 20 MIN: CPT | Performed by: NURSE PRACTITIONER

## 2025-07-30 PROCEDURE — 1159F MED LIST DOCD IN RCRD: CPT | Performed by: NURSE PRACTITIONER

## 2025-07-30 PROCEDURE — 1126F AMNT PAIN NOTED NONE PRSNT: CPT | Performed by: NURSE PRACTITIONER

## 2025-07-30 PROCEDURE — 51798 US URINE CAPACITY MEASURE: CPT | Performed by: NURSE PRACTITIONER

## 2025-07-30 PROCEDURE — 3078F DIAST BP <80 MM HG: CPT | Performed by: NURSE PRACTITIONER

## 2025-07-30 PROCEDURE — 81003 URINALYSIS AUTO W/O SCOPE: CPT | Mod: QW | Performed by: NURSE PRACTITIONER

## 2025-07-30 PROCEDURE — 3074F SYST BP LT 130 MM HG: CPT | Performed by: NURSE PRACTITIONER

## 2025-07-30 ASSESSMENT — ENCOUNTER SYMPTOMS
LOSS OF SENSATION IN FEET: 0
DEPRESSION: 0
OCCASIONAL FEELINGS OF UNSTEADINESS: 0

## 2025-07-30 ASSESSMENT — PAIN SCALES - GENERAL: PAINLEVEL_OUTOF10: 0-NO PAIN

## 2025-07-30 NOTE — PROGRESS NOTES
Madalyn Chavez is a 79 y.o. female who received 180 units of Botox 5 weeks ago on 6/23/25.     Testing results:  PVR results are available and reviewed,  ml by U/S  UA results available and reviewed    Laboratory:   Urine dipstick shows negative.      CULTURE, URINE, ROUTINE   Date Value Ref Range Status   05/22/2025 SEE NOTE (A)  Final     Comment:         CULTURE, URINE, ROUTINE         Micro Number:      [id number]    Test Status:       Final    Specimen Source:   Urine, clean catch    Specimen Quality:  Adequate    Result:            Greater than 100,000 CFU/mL of Escherichia coli                              E.coli                            ----------------                            INT   AYLA     AMOX/CLAVULANATE       I     16     AMP/SULBACTAM          R     >=32     CEFAZOLIN              NR    <=4 **2     CEFEPIME               S     <=0.12     CEFTAZIDIME            S     <=1     CEFTRIAXONE            S     <=0.25     CIPROFLOXACIN          S     <=0.06     GENTAMICIN             S     <=1     IMIPENEM               S     <=0.25     LEVOFLOXACIN           S     <=0.12     MEROPENEM              S     <=0.25     NITROFURANTOIN         S     <=16     PIP/TAZOBACTAM         S     <=4     TRIMETHOPRIM/SULFA     S     <=20    S = Susceptible  I = Intermediate  R = Resistant  NS = Not susceptible  SDD = Susceptible Dose Dependent  * = Not Tested  NR = Not Reported  **NN = See Therapy Comments      THERAPY COMMENTS        Note 1:      For infections other than uncomplicated UTI      caused by E. coli, K. pneumoniae or P. mirabilis:      Cefazolin is resistant if AYLA > or = 8 mcg/mL.      (Distinguishing susceptible versus intermediate      for isolates with AYLA < or = 4 mcg/mL requires      additional testing.)        Note 2:      For uncomplicated UTI caused by E. coli,      K. pneumoniae or P. mirabilis: Cefazolin is      susceptible if AYLA <32 mcg/mL and predicts      susceptible to the oral  agents cefaclor, cefdinir,      cefpodoxime, cefprozil, cefuroxime, cephalexin      and loracarbef.         History:  Urinary Symptoms:   - She has taken Toviaz when she was out of the Myrbetriq. She hasn't been taking both of the medications at the same time. She is not on Toviaz now.   - She never passes a catheter at home.   - Her only bladder problems occur at night. Otherwise, she is not having significant OAB issues. She occasionally leaks urine throughout the day, but this has improved after Botox.   - When she is at home, she waits a few minutes after a void and urinates again.     Assessment and Plan  79 y.o. female being assessed s/p 180 units of Botox on 6/23/25 with Dr. Borja.     Diagnoses:   #1 Urinary retention     Plan:   1. Urinary retention   -  ml by U/S.   - Urine is negative.   - Her OAB sxs have improved s/p Botox with Dr. Borja.   - Patient prefers to monitor her retention for now. She is not feeling symptomatic of urinary retention.      Follow-up in 1 month with REAGAN Voss. We will repeat her PVR and urine dip.     Scribe Attestation:   I, Danica Gabriel, am scribing for virtually, and in the presence of REAGAN Voss on 07/30/2025 at 3:13 PM.     SPEKE audio duration: 10 minutes    I spent a total of eConsult Time: 20 minutes in face to face and non face to face time.   I, REAGAN Voss, personally performed the services described in this documentation which was scribed virtually and I confirm that it is both accurate and complete.

## 2025-09-02 ENCOUNTER — APPOINTMENT (OUTPATIENT)
Dept: OBSTETRICS AND GYNECOLOGY | Facility: CLINIC | Age: 80
End: 2025-09-02
Payer: MEDICARE

## 2025-11-11 ENCOUNTER — APPOINTMENT (OUTPATIENT)
Dept: DERMATOLOGY | Facility: CLINIC | Age: 80
End: 2025-11-11
Payer: MEDICARE

## 2025-11-25 ENCOUNTER — APPOINTMENT (OUTPATIENT)
Dept: OPHTHALMOLOGY | Facility: CLINIC | Age: 80
End: 2025-11-25
Payer: MEDICARE

## 2026-02-26 ENCOUNTER — APPOINTMENT (OUTPATIENT)
Dept: ENDOCRINOLOGY | Facility: CLINIC | Age: 81
End: 2026-02-26
Payer: MEDICARE